# Patient Record
Sex: FEMALE | URBAN - NONMETROPOLITAN AREA
[De-identification: names, ages, dates, MRNs, and addresses within clinical notes are randomized per-mention and may not be internally consistent; named-entity substitution may affect disease eponyms.]

---

## 2019-04-22 ENCOUNTER — OUTSIDE SERVICES (OUTPATIENT)
Dept: FAMILY MEDICINE CLINIC | Age: 79
End: 2019-04-22
Payer: MEDICARE

## 2019-04-22 VITALS
WEIGHT: 131.6 LBS | RESPIRATION RATE: 18 BRPM | SYSTOLIC BLOOD PRESSURE: 114 MMHG | DIASTOLIC BLOOD PRESSURE: 76 MMHG | BODY MASS INDEX: 22.47 KG/M2 | TEMPERATURE: 98 F | HEART RATE: 72 BPM | OXYGEN SATURATION: 96 % | HEIGHT: 64 IN

## 2019-04-22 DIAGNOSIS — J30.89 NON-SEASONAL ALLERGIC RHINITIS DUE TO OTHER ALLERGIC TRIGGER: ICD-10-CM

## 2019-04-22 DIAGNOSIS — G30.1 LATE ONSET ALZHEIMER'S DISEASE WITH BEHAVIORAL DISTURBANCE (HCC): ICD-10-CM

## 2019-04-22 DIAGNOSIS — K21.9 GASTROESOPHAGEAL REFLUX DISEASE WITHOUT ESOPHAGITIS: ICD-10-CM

## 2019-04-22 DIAGNOSIS — M15.3 OTHER SECONDARY OSTEOARTHRITIS OF MULTIPLE SITES: ICD-10-CM

## 2019-04-22 DIAGNOSIS — F33.1 MODERATE EPISODE OF RECURRENT MAJOR DEPRESSIVE DISORDER (HCC): ICD-10-CM

## 2019-04-22 DIAGNOSIS — G47.09 OTHER INSOMNIA: ICD-10-CM

## 2019-04-22 DIAGNOSIS — L12.0 BULLOUS PEMPHIGOID: ICD-10-CM

## 2019-04-22 DIAGNOSIS — F02.818 LATE ONSET ALZHEIMER'S DISEASE WITH BEHAVIORAL DISTURBANCE (HCC): ICD-10-CM

## 2019-04-22 DIAGNOSIS — I10 ESSENTIAL HYPERTENSION: Primary | ICD-10-CM

## 2019-04-22 PROBLEM — J30.9 ALLERGIC RHINITIS: Status: ACTIVE | Noted: 2019-04-22

## 2019-04-22 PROBLEM — M15.9 POLYOSTEOARTHRITIS: Status: ACTIVE | Noted: 2019-04-22

## 2019-04-22 PROBLEM — G30.9 ALZHEIMER'S DISEASE (HCC): Status: ACTIVE | Noted: 2019-04-22

## 2019-04-22 PROBLEM — F02.80 ALZHEIMER'S DISEASE (HCC): Status: ACTIVE | Noted: 2019-04-22

## 2019-04-22 PROBLEM — F32.9 MDD (MAJOR DEPRESSIVE DISORDER): Status: ACTIVE | Noted: 2019-04-22

## 2019-04-22 PROCEDURE — 99309 SBSQ NF CARE MODERATE MDM 30: CPT | Performed by: NURSE PRACTITIONER

## 2019-04-22 RX ORDER — MONTELUKAST SODIUM 10 MG/1
10 TABLET ORAL NIGHTLY
COMMUNITY

## 2019-04-22 RX ORDER — DIAPER,BRIEF,INFANT-TODD,DISP
EACH MISCELLANEOUS DAILY
COMMUNITY

## 2019-04-22 RX ORDER — MYCOPHENOLATE MOFETIL 500 MG/1
500 TABLET ORAL 2 TIMES DAILY
COMMUNITY

## 2019-04-22 RX ORDER — CALCIUM CARBONATE 500(1250)
1500 TABLET ORAL DAILY
COMMUNITY
End: 2019-12-06

## 2019-04-22 RX ORDER — BUSPIRONE HYDROCHLORIDE 5 MG/1
7.5 TABLET ORAL 3 TIMES DAILY
COMMUNITY

## 2019-04-22 RX ORDER — LORAZEPAM 1 MG/1
0.5 TABLET ORAL NIGHTLY
COMMUNITY
End: 2019-12-06

## 2019-04-22 RX ORDER — SUCRALFATE 1 G/1
1 TABLET ORAL 2 TIMES DAILY
COMMUNITY

## 2019-04-22 RX ORDER — DIVALPROEX SODIUM 125 MG/1
250 CAPSULE, COATED PELLETS ORAL 3 TIMES DAILY
COMMUNITY

## 2019-04-22 RX ORDER — TRIAMCINOLONE ACETONIDE 1 MG/G
CREAM TOPICAL NIGHTLY
COMMUNITY

## 2019-04-22 RX ORDER — TRAZODONE HYDROCHLORIDE 50 MG/1
50 TABLET ORAL NIGHTLY
COMMUNITY

## 2019-04-22 RX ORDER — LORAZEPAM 0.5 MG/1
0.5 TABLET ORAL DAILY
COMMUNITY
End: 2019-12-06

## 2019-04-22 RX ORDER — FEXOFENADINE HCL 180 MG/1
180 TABLET ORAL DAILY
COMMUNITY

## 2019-04-22 RX ORDER — SIMVASTATIN 40 MG
40 TABLET ORAL NIGHTLY
COMMUNITY
End: 2019-04-22

## 2019-04-22 RX ORDER — SERTRALINE HYDROCHLORIDE 100 MG/1
100 TABLET, FILM COATED ORAL DAILY
COMMUNITY

## 2019-04-22 RX ORDER — RANITIDINE 150 MG/1
150 TABLET ORAL DAILY
COMMUNITY
End: 2019-12-06

## 2019-04-22 RX ORDER — BISACODYL 10 MG
10 SUPPOSITORY, RECTAL RECTAL DAILY PRN
COMMUNITY

## 2019-04-22 RX ORDER — TRIAMTERENE AND HYDROCHLOROTHIAZIDE 37.5; 25 MG/1; MG/1
1 TABLET ORAL DAILY
COMMUNITY
End: 2019-05-20 | Stop reason: ALTCHOICE

## 2019-04-22 RX ORDER — LEVOTHYROXINE SODIUM 0.07 MG/1
75 TABLET ORAL DAILY
COMMUNITY
End: 2019-04-22

## 2019-04-22 SDOH — HEALTH STABILITY: MENTAL HEALTH: HOW OFTEN DO YOU HAVE A DRINK CONTAINING ALCOHOL?: NEVER

## 2019-04-22 ASSESSMENT — ENCOUNTER SYMPTOMS
COUGH: 0
TROUBLE SWALLOWING: 1
RHINORRHEA: 0
ABDOMINAL DISTENTION: 0
VOMITING: 0
WHEEZING: 0
DIARRHEA: 0
NAUSEA: 0
SHORTNESS OF BREATH: 0
EYE REDNESS: 0
SORE THROAT: 0
BLOOD IN STOOL: 0
ABDOMINAL PAIN: 1
CONSTIPATION: 0

## 2019-04-22 NOTE — PROGRESS NOTES
Rodolfo Gupta is a 78 y.o. female who presents today for her medical conditions/complaints as noted below. Chief Complaint   Patient presents with    Other     One month visit for follow up on chronic health conditions           HPI:     HPI     Henrique Lauren was seen and examined today for her monthly visit. She is seen up in her chair. She is restless today and has been having increased agitation in the evening with nursing staff specifically at meal time. She also complains today of her stomach hurting which does seem to be a chronic complaint. She does have current medications but these do not seem to have addressed this complaint. She does have significant dementia so I am not sure this is an accurate description of pain. Her weight is overall stable. Her labs from 4/1 have been reviewed today and these are stable. She is dependent on staff for all ADL's. No falls since September. Past Medical History:   Diagnosis Date    Allergic rhinitis     Alzheimer's disease     Anxiety disorder     Bullous pemphigoid     Dysphagia     GERD (gastroesophageal reflux disease)     HLD (hyperlipidemia)     HTN (hypertension)     MDD (major depressive disorder)     Meniere's disease     Parkinson's disease ([facility] [address]     Polyosteoarthritis       History reviewed. No pertinent surgical history. Family History   Family history unknown: Yes       Social History     Tobacco Use    Smoking status: Never Smoker    Smokeless tobacco: Never Used   Substance Use Topics    Alcohol use: Never     Frequency: Never      Current Outpatient Medications   Medication Sig Dispense Refill    acetaminophen (TYLENOL) 325 MG tablet Take 650 mg by mouth three times daily And every 4hours prn pan or fever.  LORazepam (ATIVAN) 0.5 MG tablet Take 0.5 mg by mouth daily.  LORazepam (ATIVAN) 1 MG tablet Take 1 mg by mouth nightly.       Menthol, Topical Analgesic, (BIOFREEZE) 4 % GEL Apply topically as needed      bisacodyl (DULCOLAX) 10 MG suppository Place 10 mg rectally daily as needed for Constipation      busPIRone (BUSPAR) 5 MG tablet Take 7.5 mg by mouth 2 times daily      calcium carbonate (OSCAL) 500 MG TABS tablet Take 500 mg by mouth 2 times daily      mycophenolate (CELLCEPT) 500 MG tablet Take 500 mg by mouth 2 times daily      fexofenadine (ALLEGRA) 180 MG tablet Take 180 mg by mouth daily      hydrocortisone 1 % cream Apply topically daily Apply topically 2 times daily.  magnesium hydroxide (MILK OF MAGNESIA) 400 MG/5ML suspension Take 30 mLs by mouth daily as needed for Constipation      montelukast (SINGULAIR) 10 MG tablet Take 10 mg by mouth nightly      sucralfate (CARAFATE) 1 GM tablet Take 1 g by mouth 4 times daily      traZODone (DESYREL) 50 MG tablet Take 50 mg by mouth nightly      triamcinolone (KENALOG) 0.1 % cream Apply topically nightly Apply topically 2 times daily.  triamterene-hydrochlorothiazide (MAXZIDE-25) 37.5-25 MG per tablet Take 1 tablet by mouth daily      vitamin D (CHOLECALCIFEROL) 1000 UNIT TABS tablet Take 1,000 Units by mouth daily      sertraline (ZOLOFT) 100 MG tablet Take 100 mg by mouth daily      ranitidine (ZANTAC) 150 MG tablet Take 150 mg by mouth daily      divalproex (DEPAKOTE SPRINKLE) 125 MG capsule Take 125 mg by mouth 2 times daily       No current facility-administered medications for this visit.       Allergies   Allergen Reactions    Aricept [Donepezil Hcl]     Cardizem [Diltiazem Hcl]     Cardura [Doxazosin Mesylate]     Codeine     Doxycycline     Macrodantin [Nitrofurantoin Macrocrystal]     Sulfa Antibiotics        Health Maintenance   Topic Date Due    Potassium monitoring  1940    Creatinine monitoring  1940    DTaP/Tdap/Td vaccine (1 - Tdap) 01/11/1959    Shingles Vaccine (1 of 2) 01/11/1990    DEXA (modify frequency per FRAX score)  01/11/2005    Pneumococcal 65+ years Vaccine (1 of 2 - PCV13) 01/11/2005    Flu vaccine No murmur heard. Pulmonary/Chest: Effort normal and breath sounds normal. No stridor. No respiratory distress. She has no wheezes. She has no rales. Abdominal: Soft. Bowel sounds are normal. She exhibits no distension. There is no tenderness. Musculoskeletal: Normal range of motion. She exhibits no edema or deformity. Right shoulder: She exhibits no tenderness, no bony tenderness and no pain. Left shoulder: She exhibits no tenderness, no bony tenderness and no pain. Cervical back: She exhibits no tenderness, no bony tenderness and no pain. Thoracic back: She exhibits no tenderness, no bony tenderness, no pain and no spasm. Lumbar back: She exhibits no tenderness, no bony tenderness and no pain. Lymphadenopathy:     She has no cervical adenopathy. Right: No supraclavicular adenopathy present. Left: No supraclavicular adenopathy present. Neurological: She is alert. She is disoriented. She displays no atrophy and no tremor. A sensory deficit is present. She exhibits abnormal muscle tone. She displays no seizure activity. Coordination and gait abnormal.   Skin: Skin is warm, dry and intact. Capillary refill takes 2 to 3 seconds. No rash noted. No erythema. No pallor. Psychiatric: Her mood appears anxious. Her speech is delayed. She is agitated. Thought content is paranoid. Cognition and memory are impaired. She expresses impulsivity. She exhibits abnormal recent memory and abnormal remote memory. She is inattentive. Nursing note and vitals reviewed. /76   Pulse 72   Temp 98 °F (36.7 °C)   Resp 18   Ht 5' 4\" (1.626 m)   Wt 131 lb 9.6 oz (59.7 kg)   SpO2 96%   BMI 22.59 kg/m²     Assessment:       Diagnosis Orders   1. Essential hypertension     2. Late onset Alzheimer's disease with behavioral disturbance     3. Bullous pemphigoid     4. Moderate episode of recurrent major depressive disorder ([facility] [address]     5.  Gastroesophageal reflux disease without esophagitis     6. Non-seasonal allergic rhinitis due to other allergic trigger     7. Other insomnia     8. Other secondary osteoarthritis of multiple sites         Plan:     1. HTN- Chronic and stable. Continue Triamterene/HCTZ. Monitor. Blood pressures stable. 2. Alzheimer's dementia with anxiety- Chronic and increased agitation in evening. Continue Ativan, Zoloft and Buspar. Will add a low dose of Depakote in the evening. 3. Bullous pemphigous-  Will continue Cellcept. No skin issues. Labs stable. 4. Depression- Chronic and stable. continue Zoloft. Will continue to monitor. 5. GERD with hx of PUD- Complains of stomach pain. This is a chronic complaint. Will dc Pantoprazole and Pepcid. Will increase Carafate and change to Zantac. eating well with assistance. Weights are good. 6. Allergic rhinitis-continue fexofenadline, singulair. 7. Insomnia- Chronic and stable. Continue trazodone. Will continue to monitor. 8. OA- Chronic and stable. Will continue Tylenol and monitor.      Electronically signed by AMIRAH Lucia CNP on 4/22/2019 at 11:01 AM

## 2019-05-20 ENCOUNTER — OUTSIDE SERVICES (OUTPATIENT)
Dept: FAMILY MEDICINE CLINIC | Age: 79
End: 2019-05-20
Payer: MEDICARE

## 2019-05-20 VITALS
BODY MASS INDEX: 22.59 KG/M2 | SYSTOLIC BLOOD PRESSURE: 96 MMHG | HEART RATE: 72 BPM | OXYGEN SATURATION: 93 % | WEIGHT: 131.6 LBS | RESPIRATION RATE: 16 BRPM | DIASTOLIC BLOOD PRESSURE: 59 MMHG | TEMPERATURE: 97.1 F

## 2019-05-20 DIAGNOSIS — L12.0 BULLOUS PEMPHIGOID: ICD-10-CM

## 2019-05-20 DIAGNOSIS — F33.1 MODERATE EPISODE OF RECURRENT MAJOR DEPRESSIVE DISORDER (HCC): ICD-10-CM

## 2019-05-20 DIAGNOSIS — G47.09 OTHER INSOMNIA: ICD-10-CM

## 2019-05-20 DIAGNOSIS — F02.818 LATE ONSET ALZHEIMER'S DISEASE WITH BEHAVIORAL DISTURBANCE (HCC): ICD-10-CM

## 2019-05-20 DIAGNOSIS — J30.89 NON-SEASONAL ALLERGIC RHINITIS DUE TO OTHER ALLERGIC TRIGGER: ICD-10-CM

## 2019-05-20 DIAGNOSIS — I10 ESSENTIAL HYPERTENSION: Primary | ICD-10-CM

## 2019-05-20 DIAGNOSIS — K21.9 GASTROESOPHAGEAL REFLUX DISEASE WITHOUT ESOPHAGITIS: ICD-10-CM

## 2019-05-20 DIAGNOSIS — G30.1 LATE ONSET ALZHEIMER'S DISEASE WITH BEHAVIORAL DISTURBANCE (HCC): ICD-10-CM

## 2019-05-20 DIAGNOSIS — M15.3 OTHER SECONDARY OSTEOARTHRITIS OF MULTIPLE SITES: ICD-10-CM

## 2019-05-20 PROCEDURE — 99309 SBSQ NF CARE MODERATE MDM 30: CPT | Performed by: NURSE PRACTITIONER

## 2019-05-20 RX ORDER — HYDROCHLOROTHIAZIDE 12.5 MG/1
12.5 TABLET ORAL DAILY
COMMUNITY
End: 2019-06-24

## 2019-05-20 ASSESSMENT — ENCOUNTER SYMPTOMS
NAUSEA: 0
RHINORRHEA: 0
DIARRHEA: 0
WHEEZING: 0
CONSTIPATION: 0
COUGH: 0
TROUBLE SWALLOWING: 0
SORE THROAT: 0
EYE REDNESS: 0
VOMITING: 0
SHORTNESS OF BREATH: 0

## 2019-05-20 NOTE — PROGRESS NOTES
ourmaurice Cramer is a 78 y.o. female who presents today for her medical conditions/complaints as noted below. Chief Complaint   Patient presents with    1 Month Follow-Up     Follow up on chronic health conditions           HPI:     CJ Sandoval is seen today for her monthly visit. She has had increased behaviors and these are increase in the evening. She is quiet this am and does not talk. Her chart is reviewed and there is no significant changes in her weight. She is running low with her blood pressures although. She eats poorly for her supper meal and this is likely due to behaviors. Past Medical History:   Diagnosis Date    Allergic rhinitis     Alzheimer's disease     Anxiety disorder     Bullous pemphigoid     Dysphagia     GERD (gastroesophageal reflux disease)     HLD (hyperlipidemia)     HTN (hypertension)     MDD (major depressive disorder)     Meniere's disease     Parkinson's disease ([facility] [address]     Polyosteoarthritis       No past surgical history on file. Family History   Family history unknown: Yes       Social History     Tobacco Use    Smoking status: Never Smoker    Smokeless tobacco: Never Used   Substance Use Topics    Alcohol use: Never     Frequency: Never      Current Outpatient Medications   Medication Sig Dispense Refill    hydrochlorothiazide (HYDRODIURIL) 12.5 MG tablet Take 12.5 mg by mouth daily      acetaminophen (TYLENOL) 325 MG tablet Take 650 mg by mouth three times daily And every 4hours prn pan or fever.  LORazepam (ATIVAN) 0.5 MG tablet Take 0.5 mg by mouth daily.  LORazepam (ATIVAN) 1 MG tablet Take 1 mg by mouth nightly.       Menthol, Topical Analgesic, (BIOFREEZE) 4 % GEL Apply topically as needed      bisacodyl (DULCOLAX) 10 MG suppository Place 10 mg rectally daily as needed for Constipation      busPIRone (BUSPAR) 5 MG tablet Take 7.5 mg by mouth 3 times daily       calcium carbonate (OSCAL) 500 MG TABS tablet Take 1,500 mg by mouth daily  mycophenolate (CELLCEPT) 500 MG tablet Take 500 mg by mouth 2 times daily      fexofenadine (ALLEGRA) 180 MG tablet Take 180 mg by mouth daily      hydrocortisone 1 % cream Apply topically daily Apply topically 2 times daily.  magnesium hydroxide (MILK OF MAGNESIA) 400 MG/5ML suspension Take 30 mLs by mouth daily as needed for Constipation      montelukast (SINGULAIR) 10 MG tablet Take 10 mg by mouth nightly      sucralfate (CARAFATE) 1 GM tablet Take 1 g by mouth 4 times daily      traZODone (DESYREL) 50 MG tablet Take 50 mg by mouth nightly      triamcinolone (KENALOG) 0.1 % cream Apply topically nightly Apply topically 2 times daily.  vitamin D (CHOLECALCIFEROL) 1000 UNIT TABS tablet Take 1,000 Units by mouth daily      sertraline (ZOLOFT) 100 MG tablet Take 100 mg by mouth daily      ranitidine (ZANTAC) 150 MG tablet Take 150 mg by mouth daily      divalproex (DEPAKOTE SPRINKLE) 125 MG capsule Take 250 mg by mouth three times daily        No current facility-administered medications for this visit. Allergies   Allergen Reactions    Aricept [Donepezil Hcl]     Cardizem [Diltiazem Hcl]     Cardura [Doxazosin Mesylate]     Codeine     Doxycycline     Macrodantin [Nitrofurantoin Macrocrystal]     Sulfa Antibiotics        Health Maintenance   Topic Date Due    Potassium monitoring  1940    Creatinine monitoring  1940    DTaP/Tdap/Td vaccine (1 - Tdap) 01/11/1959    Shingles Vaccine (1 of 2) 01/11/1990    DEXA (modify frequency per FRAX score)  01/11/2005    Pneumococcal 65+ years Vaccine (1 of 2 - PCV13) 01/11/2005    Flu vaccine (Season Ended) 09/01/2019       Subjective:      Review of Systems   Constitutional: Negative for chills, fatigue, fever and unexpected weight change. HENT: Negative for congestion, rhinorrhea, sore throat and trouble swallowing. Eyes: Positive for visual disturbance. Negative for redness.    Respiratory: Negative for cough, shortness of breath and wheezing. Cardiovascular: Negative for chest pain and leg swelling. Gastrointestinal: Negative for constipation, diarrhea, nausea and vomiting. Endocrine: Negative for polydipsia and polyuria. Genitourinary: Positive for decreased urine volume. Negative for dysuria, frequency and hematuria. Musculoskeletal: Positive for gait problem (up per staff assist). Negative for arthralgias and myalgias. Skin: Negative for rash and wound. Allergic/Immunologic: Negative for environmental allergies and immunocompromised state. Neurological: Positive for speech difficulty and weakness. Negative for dizziness, light-headedness and headaches. Hematological: Does not bruise/bleed easily. Psychiatric/Behavioral: Positive for agitation, behavioral problems (per staff interview), confusion, decreased concentration and dysphoric mood. Negative for sleep disturbance. The patient is nervous/anxious (more in the evening) and is hyperactive. Objective:     Physical Exam   Constitutional: She appears well-developed and well-nourished. No distress. HENT:   Head: Normocephalic and atraumatic. Right Ear: External ear normal.   Left Ear: External ear normal.   Nose: Nose normal.   Mouth/Throat: Oropharynx is clear and moist and mucous membranes are normal.   Eyes: Conjunctivae and EOM are normal. Right eye exhibits no discharge. Left eye exhibits no discharge. No scleral icterus. Neck: Neck supple. No JVD present. No thyromegaly present. Cardiovascular: Normal rate, regular rhythm, normal heart sounds and intact distal pulses. Pulmonary/Chest: Effort normal and breath sounds normal. No stridor. No respiratory distress. She has no wheezes. She has no rales. Abdominal: Soft. Bowel sounds are normal. She exhibits no distension. There is no tenderness. Musculoskeletal: Normal range of motion. She exhibits no edema or deformity.         Right shoulder: She exhibits no tenderness, no bony Cellcept. No skin issues. Labs stable. 4. Depression- Chronic and stable. continue Zoloft. Will continue to monitor. 5. GERD with hx of PUD- No complaints. Will continue Carafate and Zantac. Weights are stabpe  6. Allergic rhinitis-continue fexofenadline, singulair. 7. Insomnia- Chronic and stable. Continue trazodone. Will continue to monitor. 8. OA- Chronic and stable. Will continue Tylenol and monitor.      Electronically signed by AMIRAH Garcia CNP on 5/20/2019 at 10:39 AM

## 2019-06-24 ENCOUNTER — OUTSIDE SERVICES (OUTPATIENT)
Dept: FAMILY MEDICINE CLINIC | Age: 79
End: 2019-06-24
Payer: MEDICARE

## 2019-06-24 VITALS
OXYGEN SATURATION: 94 % | RESPIRATION RATE: 16 BRPM | DIASTOLIC BLOOD PRESSURE: 60 MMHG | SYSTOLIC BLOOD PRESSURE: 89 MMHG | HEART RATE: 71 BPM | BODY MASS INDEX: 22.18 KG/M2 | TEMPERATURE: 98.2 F | WEIGHT: 129.2 LBS

## 2019-06-24 DIAGNOSIS — J30.89 NON-SEASONAL ALLERGIC RHINITIS DUE TO OTHER ALLERGIC TRIGGER: ICD-10-CM

## 2019-06-24 DIAGNOSIS — G30.1 LATE ONSET ALZHEIMER'S DISEASE WITH BEHAVIORAL DISTURBANCE (HCC): ICD-10-CM

## 2019-06-24 DIAGNOSIS — L12.0 BULLOUS PEMPHIGOID: ICD-10-CM

## 2019-06-24 DIAGNOSIS — G47.09 OTHER INSOMNIA: ICD-10-CM

## 2019-06-24 DIAGNOSIS — K21.9 GASTROESOPHAGEAL REFLUX DISEASE WITHOUT ESOPHAGITIS: ICD-10-CM

## 2019-06-24 DIAGNOSIS — F02.818 LATE ONSET ALZHEIMER'S DISEASE WITH BEHAVIORAL DISTURBANCE (HCC): ICD-10-CM

## 2019-06-24 DIAGNOSIS — F33.1 MODERATE EPISODE OF RECURRENT MAJOR DEPRESSIVE DISORDER (HCC): ICD-10-CM

## 2019-06-24 DIAGNOSIS — M15.3 OTHER SECONDARY OSTEOARTHRITIS OF MULTIPLE SITES: ICD-10-CM

## 2019-06-24 DIAGNOSIS — I10 ESSENTIAL HYPERTENSION: Primary | ICD-10-CM

## 2019-06-24 PROCEDURE — 99309 SBSQ NF CARE MODERATE MDM 30: CPT | Performed by: NURSE PRACTITIONER

## 2019-06-24 ASSESSMENT — ENCOUNTER SYMPTOMS
NAUSEA: 0
VOMITING: 0
RHINORRHEA: 0
COUGH: 0
TROUBLE SWALLOWING: 0
DIARRHEA: 0
EYE REDNESS: 0
SORE THROAT: 0
SHORTNESS OF BREATH: 0
WHEEZING: 0
CONSTIPATION: 0

## 2019-06-24 NOTE — PROGRESS NOTES
Gabriel Damico is a 78 y.o. female who presents today for her medical conditions/complaints as noted below. Chief Complaint   Patient presents with    Dementia           HPI:         Brielle Whitehead is seen today for her monthly visit. She has had some improvement in her behaviors. She is quiet this afternoon and lying in bed. She has had a mild weight loss of 3# in the past month. Her blood pressures have been running low. Her spouse has passed away in the past month, but she does not seem to have been more tearful. Past Medical History:   Diagnosis Date    Allergic rhinitis     Alzheimer's disease     Anxiety disorder     Bullous pemphigoid     Dysphagia     GERD (gastroesophageal reflux disease)     HLD (hyperlipidemia)     HTN (hypertension)     MDD (major depressive disorder)     Meniere's disease     Parkinson's disease ([facility] [address]     Polyosteoarthritis       No past surgical history on file. Family History   Family history unknown: Yes       Social History     Tobacco Use    Smoking status: Never Smoker    Smokeless tobacco: Never Used   Substance Use Topics    Alcohol use: Never     Frequency: Never      Current Outpatient Medications   Medication Sig Dispense Refill    acetaminophen (TYLENOL) 325 MG tablet Take 650 mg by mouth three times daily And every 4hours prn pan or fever.  LORazepam (ATIVAN) 0.5 MG tablet Take 0.5 mg by mouth daily.  LORazepam (ATIVAN) 1 MG tablet Take 1 mg by mouth nightly.       Menthol, Topical Analgesic, (BIOFREEZE) 4 % GEL Apply topically as needed      bisacodyl (DULCOLAX) 10 MG suppository Place 10 mg rectally daily as needed for Constipation      busPIRone (BUSPAR) 5 MG tablet Take 7.5 mg by mouth 3 times daily       calcium carbonate (OSCAL) 500 MG TABS tablet Take 1,500 mg by mouth daily       mycophenolate (CELLCEPT) 500 MG tablet Take 500 mg by mouth 2 times daily      fexofenadine (ALLEGRA) 180 MG tablet Take 180 mg by mouth daily      hydrocortisone 1 % cream Apply topically daily Apply topically 2 times daily.  magnesium hydroxide (MILK OF MAGNESIA) 400 MG/5ML suspension Take 30 mLs by mouth daily as needed for Constipation      montelukast (SINGULAIR) 10 MG tablet Take 10 mg by mouth nightly      sucralfate (CARAFATE) 1 GM tablet Take 1 g by mouth 4 times daily      traZODone (DESYREL) 50 MG tablet Take 50 mg by mouth nightly      triamcinolone (KENALOG) 0.1 % cream Apply topically nightly Apply topically 2 times daily.  vitamin D (CHOLECALCIFEROL) 1000 UNIT TABS tablet Take 1,000 Units by mouth daily      sertraline (ZOLOFT) 100 MG tablet Take 100 mg by mouth daily      ranitidine (ZANTAC) 150 MG tablet Take 150 mg by mouth daily      divalproex (DEPAKOTE SPRINKLE) 125 MG capsule Take 250 mg by mouth three times daily        No current facility-administered medications for this visit. Allergies   Allergen Reactions    Aricept [Donepezil Hcl]     Cardizem [Diltiazem Hcl]     Cardura [Doxazosin Mesylate]     Codeine     Doxycycline     Macrodantin [Nitrofurantoin Macrocrystal]     Sulfa Antibiotics        Health Maintenance   Topic Date Due    Potassium monitoring  1940    Creatinine monitoring  1940    DTaP/Tdap/Td vaccine (1 - Tdap) 01/11/1959    Shingles Vaccine (1 of 2) 01/11/1990    DEXA (modify frequency per FRAX score)  01/11/2005    Pneumococcal 65+ years Vaccine (1 of 2 - PCV13) 01/11/2005    Flu vaccine (Season Ended) 09/01/2019       Subjective:      Review of Systems   Constitutional: Negative for chills, fatigue, fever and unexpected weight change. HENT: Negative for congestion, rhinorrhea, sore throat and trouble swallowing. Eyes: Positive for visual disturbance. Negative for redness. Respiratory: Negative for cough, shortness of breath and wheezing. Cardiovascular: Negative for chest pain and leg swelling.    Gastrointestinal: Negative for constipation, diarrhea, nausea and bony tenderness and no pain. Thoracic back: She exhibits no tenderness, no bony tenderness, no pain and no spasm. Lumbar back: She exhibits no tenderness, no bony tenderness and no pain. Lymphadenopathy:     She has no cervical adenopathy. Right: No supraclavicular adenopathy present. Left: No supraclavicular adenopathy present. Neurological: She is alert. She is disoriented. She displays no atrophy. She exhibits normal muscle tone. She displays no seizure activity. Skin: Skin is warm, dry and intact. No rash noted. No erythema. Psychiatric: Her mood appears anxious (more in the evening, quiet today). Her speech is delayed. She is withdrawn and combative (at times per facility staff). Cognition and memory are impaired. She expresses impulsivity. She exhibits abnormal recent memory and abnormal remote memory. Nursing note and vitals reviewed. BP 89/60   Pulse 71   Temp 98.2 °F (36.8 °C)   Resp 16   Wt 129 lb 3.2 oz (58.6 kg)   SpO2 94%   BMI 22.18 kg/m²     Assessment:       Diagnosis Orders   1. Essential hypertension     2. Late onset Alzheimer's disease with behavioral disturbance     3. Bullous pemphigoid     4. Moderate episode of recurrent major depressive disorder ([facility] [address]     5. Gastroesophageal reflux disease without esophagitis     6. Non-seasonal allergic rhinitis due to other allergic trigger     7. Other insomnia     8. Other secondary osteoarthritis of multiple sites         Plan:   1. HTN- Chronic with continued low blood pressures. Will dc HCTZ and monitor. 2. Alzheimer's dementia with anxiety- Chronic and  agitation in evening. Continue Ativan, Zoloft and Buspar. Behaviors some better per staff interview. 3. Bullous pemphigous-  Will continue Cellcept. No skin issues. 4. Depression- Chronic and stable. continue Zoloft. Will continue to monitor. 5. GERD with hx of PUD- No complaints. Will continue Carafate and Zantac. Weights are stable  6.  Allergic rhinitis-continue fexofenadline, singulair. 7. Insomnia- Chronic and stable. Continue trazodone. Will continue to monitor. 8. OA- Chronic and stable. Will continue Tylenol and monitor.      Electronically signed by AMIRAH Crockett CNP on 6/24/2019 at 4:20 PM

## 2019-07-29 ENCOUNTER — OUTSIDE SERVICES (OUTPATIENT)
Dept: FAMILY MEDICINE CLINIC | Age: 79
End: 2019-07-29
Payer: MEDICARE

## 2019-07-29 VITALS
HEART RATE: 88 BPM | WEIGHT: 129 LBS | BODY MASS INDEX: 22.14 KG/M2 | SYSTOLIC BLOOD PRESSURE: 126 MMHG | DIASTOLIC BLOOD PRESSURE: 72 MMHG | OXYGEN SATURATION: 96 % | TEMPERATURE: 98 F | RESPIRATION RATE: 16 BRPM

## 2019-07-29 DIAGNOSIS — J30.89 NON-SEASONAL ALLERGIC RHINITIS DUE TO OTHER ALLERGIC TRIGGER: ICD-10-CM

## 2019-07-29 DIAGNOSIS — G30.1 LATE ONSET ALZHEIMER'S DISEASE WITH BEHAVIORAL DISTURBANCE (HCC): ICD-10-CM

## 2019-07-29 DIAGNOSIS — K21.9 GASTROESOPHAGEAL REFLUX DISEASE WITHOUT ESOPHAGITIS: ICD-10-CM

## 2019-07-29 DIAGNOSIS — F33.1 MODERATE EPISODE OF RECURRENT MAJOR DEPRESSIVE DISORDER (HCC): ICD-10-CM

## 2019-07-29 DIAGNOSIS — I10 ESSENTIAL HYPERTENSION: Primary | ICD-10-CM

## 2019-07-29 DIAGNOSIS — F02.818 LATE ONSET ALZHEIMER'S DISEASE WITH BEHAVIORAL DISTURBANCE (HCC): ICD-10-CM

## 2019-07-29 DIAGNOSIS — M15.3 OTHER SECONDARY OSTEOARTHRITIS OF MULTIPLE SITES: ICD-10-CM

## 2019-07-29 DIAGNOSIS — L12.0 BULLOUS PEMPHIGOID: ICD-10-CM

## 2019-07-29 DIAGNOSIS — G47.09 OTHER INSOMNIA: ICD-10-CM

## 2019-07-29 PROCEDURE — 99309 SBSQ NF CARE MODERATE MDM 30: CPT | Performed by: NURSE PRACTITIONER

## 2019-07-29 ASSESSMENT — ENCOUNTER SYMPTOMS
DIARRHEA: 0
NAUSEA: 0
COUGH: 0
CONSTIPATION: 0
EYE REDNESS: 0
RHINORRHEA: 0
VOMITING: 0
WHEEZING: 0
SORE THROAT: 0
TROUBLE SWALLOWING: 0
SHORTNESS OF BREATH: 0

## 2019-07-29 NOTE — PROGRESS NOTES
tablet Take 180 mg by mouth daily      hydrocortisone 1 % cream Apply topically daily Apply topically 2 times daily.  magnesium hydroxide (MILK OF MAGNESIA) 400 MG/5ML suspension Take 30 mLs by mouth daily as needed for Constipation      montelukast (SINGULAIR) 10 MG tablet Take 10 mg by mouth nightly      sucralfate (CARAFATE) 1 GM tablet Take 1 g by mouth 4 times daily      traZODone (DESYREL) 50 MG tablet Take 50 mg by mouth nightly      triamcinolone (KENALOG) 0.1 % cream Apply topically nightly Apply topically 2 times daily.  vitamin D (CHOLECALCIFEROL) 1000 UNIT TABS tablet Take 1,000 Units by mouth daily      sertraline (ZOLOFT) 100 MG tablet Take 125 mg by mouth daily       ranitidine (ZANTAC) 150 MG tablet Take 150 mg by mouth daily      divalproex (DEPAKOTE SPRINKLE) 125 MG capsule Take 250 mg by mouth three times daily        No current facility-administered medications for this visit. Allergies   Allergen Reactions    Aricept [Donepezil Hcl]     Cardizem [Diltiazem Hcl]     Cardura [Doxazosin Mesylate]     Codeine     Doxycycline     Macrodantin [Nitrofurantoin Macrocrystal]     Sulfa Antibiotics        Health Maintenance   Topic Date Due    Potassium monitoring  1940    Creatinine monitoring  1940    DTaP/Tdap/Td vaccine (1 - Tdap) 01/11/1959    Shingles Vaccine (1 of 2) 01/11/1990    DEXA (modify frequency per FRAX score)  01/11/2005    Pneumococcal 65+ years Vaccine (1 of 2 - PCV13) 01/11/2005    Flu vaccine (1) 09/01/2019       Subjective:      Review of Systems   Constitutional: Negative for chills, fatigue, fever and unexpected weight change. HENT: Negative for congestion, rhinorrhea, sore throat and trouble swallowing. Eyes: Positive for visual disturbance. Negative for redness. Respiratory: Negative for cough, shortness of breath and wheezing. Cardiovascular: Negative for chest pain and leg swelling.    Gastrointestinal: Negative for

## 2019-08-23 ENCOUNTER — OUTSIDE SERVICES (OUTPATIENT)
Dept: FAMILY MEDICINE CLINIC | Age: 79
End: 2019-08-23
Payer: MEDICARE

## 2019-08-23 DIAGNOSIS — I10 ESSENTIAL HYPERTENSION: Primary | ICD-10-CM

## 2019-08-23 DIAGNOSIS — K21.9 GASTROESOPHAGEAL REFLUX DISEASE WITHOUT ESOPHAGITIS: ICD-10-CM

## 2019-08-23 DIAGNOSIS — M15.3 OTHER SECONDARY OSTEOARTHRITIS OF MULTIPLE SITES: ICD-10-CM

## 2019-08-23 DIAGNOSIS — L12.0 BULLOUS PEMPHIGOID: ICD-10-CM

## 2019-08-23 DIAGNOSIS — F33.1 MODERATE EPISODE OF RECURRENT MAJOR DEPRESSIVE DISORDER (HCC): ICD-10-CM

## 2019-08-23 DIAGNOSIS — G30.1 LATE ONSET ALZHEIMER'S DISEASE WITH BEHAVIORAL DISTURBANCE (HCC): ICD-10-CM

## 2019-08-23 DIAGNOSIS — F02.818 LATE ONSET ALZHEIMER'S DISEASE WITH BEHAVIORAL DISTURBANCE (HCC): ICD-10-CM

## 2019-08-23 DIAGNOSIS — G47.09 OTHER INSOMNIA: ICD-10-CM

## 2019-08-23 PROCEDURE — 99309 SBSQ NF CARE MODERATE MDM 30: CPT | Performed by: FAMILY MEDICINE

## 2019-08-23 ASSESSMENT — ENCOUNTER SYMPTOMS
SHORTNESS OF BREATH: 0
SORE THROAT: 0
ABDOMINAL PAIN: 0
CONSTIPATION: 0
DIARRHEA: 0
RHINORRHEA: 0
COUGH: 0
WHEEZING: 0

## 2019-08-23 NOTE — PROGRESS NOTES
Bullous pemphigous-  Chronic, currently asymptomatic. Will continue Cellcept. 3. MDD- chronic, stable. Continue zoloft. 4. Alzheimer's dementia with anxiety- Chronic with behaviors, moreso in evening. Continue Ativan, Zoloft and Buspar. Safety precautions. Doing well with increase of Zoloft. 5. GERD with hx of PUD- asymptomatic. Will continue Carafate and Zantac. Weights are down slightly since past month. Will monitor  6. OA- chronic and stable. No complaints today. Tylenol prn.  7. Insomnia- controlled with trazodone.         Electronically signed by Larisa Cross MD on 8/23/2019 at 11:44 AM

## 2019-09-16 ENCOUNTER — OUTSIDE SERVICES (OUTPATIENT)
Dept: FAMILY MEDICINE CLINIC | Age: 79
End: 2019-09-16
Payer: MEDICARE

## 2019-09-16 VITALS
RESPIRATION RATE: 16 BRPM | WEIGHT: 128 LBS | DIASTOLIC BLOOD PRESSURE: 80 MMHG | SYSTOLIC BLOOD PRESSURE: 124 MMHG | BODY MASS INDEX: 21.97 KG/M2 | HEART RATE: 70 BPM | TEMPERATURE: 98 F | OXYGEN SATURATION: 96 %

## 2019-09-16 DIAGNOSIS — K21.9 GASTROESOPHAGEAL REFLUX DISEASE WITHOUT ESOPHAGITIS: ICD-10-CM

## 2019-09-16 DIAGNOSIS — G30.1 LATE ONSET ALZHEIMER'S DISEASE WITH BEHAVIORAL DISTURBANCE (HCC): ICD-10-CM

## 2019-09-16 DIAGNOSIS — G47.09 OTHER INSOMNIA: ICD-10-CM

## 2019-09-16 DIAGNOSIS — F33.1 MODERATE EPISODE OF RECURRENT MAJOR DEPRESSIVE DISORDER (HCC): ICD-10-CM

## 2019-09-16 DIAGNOSIS — I10 ESSENTIAL HYPERTENSION: Primary | ICD-10-CM

## 2019-09-16 DIAGNOSIS — F02.818 LATE ONSET ALZHEIMER'S DISEASE WITH BEHAVIORAL DISTURBANCE (HCC): ICD-10-CM

## 2019-09-16 DIAGNOSIS — L12.0 BULLOUS PEMPHIGOID: ICD-10-CM

## 2019-09-16 DIAGNOSIS — M15.3 OTHER SECONDARY OSTEOARTHRITIS OF MULTIPLE SITES: ICD-10-CM

## 2019-09-16 DIAGNOSIS — J30.89 NON-SEASONAL ALLERGIC RHINITIS DUE TO OTHER ALLERGIC TRIGGER: ICD-10-CM

## 2019-09-16 PROCEDURE — 99308 SBSQ NF CARE LOW MDM 20: CPT | Performed by: NURSE PRACTITIONER

## 2019-09-16 ASSESSMENT — ENCOUNTER SYMPTOMS
CONSTIPATION: 0
DIARRHEA: 0
EYE REDNESS: 0
SHORTNESS OF BREATH: 0
RHINORRHEA: 0
NAUSEA: 0
COUGH: 0
TROUBLE SWALLOWING: 0
SORE THROAT: 0
WHEEZING: 0
VOMITING: 0

## 2019-09-16 NOTE — PROGRESS NOTES
pain.        Left shoulder: She exhibits no tenderness, no bony tenderness and no pain. Cervical back: She exhibits no tenderness, no bony tenderness and no pain. Thoracic back: She exhibits no tenderness, no bony tenderness, no pain and no spasm. Lumbar back: She exhibits no tenderness, no bony tenderness and no pain. Lymphadenopathy:     She has no cervical adenopathy. Right: No supraclavicular adenopathy present. Left: No supraclavicular adenopathy present. Neurological: She is alert. She is disoriented. She displays no atrophy. A sensory deficit is present. She exhibits normal muscle tone. She displays no seizure activity. Coordination abnormal.   Skin: Skin is warm, dry and intact. No rash noted. No erythema. Psychiatric: Her mood appears anxious (more in the evening, quiet today). Her speech is delayed. She is withdrawn and combative (at times per facility staff). Cognition and memory are impaired. She expresses impulsivity. She exhibits abnormal recent memory and abnormal remote memory. Nursing note and vitals reviewed. /80   Pulse 70   Temp 98 °F (36.7 °C)   Resp 16   Wt 128 lb (58.1 kg)   SpO2 96%   BMI 21.97 kg/m²     Assessment:       Diagnosis Orders   1. Essential hypertension     2. Bullous pemphigoid     3. Moderate episode of recurrent major depressive disorder ([facility] [address]     4. Late onset Alzheimer's disease with behavioral disturbance     5. Gastroesophageal reflux disease without esophagitis     6. Other secondary osteoarthritis of multiple sites     7. Other insomnia     8. Non-seasonal allergic rhinitis due to other allergic trigger         Plan:   1. HTN- Chronic with continued low blood pressures. On no medications and blood pressures stable. 2. Alzheimer's dementia with anxiety- Chronic and  agitation in evening. Continue Ativan, Zoloft and Buspar. 3. Bullous pemphigous- Chronic and stable. Continue Cellcept. No skin issues.    4.

## 2019-10-05 DIAGNOSIS — F33.1 MODERATE EPISODE OF RECURRENT MAJOR DEPRESSIVE DISORDER (HCC): Primary | ICD-10-CM

## 2019-10-07 RX ORDER — LORAZEPAM 0.5 MG/1
TABLET ORAL
Qty: 90 TABLET | Refills: 2 | Status: SHIPPED | OUTPATIENT
Start: 2019-10-07 | End: 2020-01-05

## 2019-10-23 ENCOUNTER — OUTSIDE SERVICES (OUTPATIENT)
Dept: FAMILY MEDICINE CLINIC | Age: 79
End: 2019-10-23
Payer: MEDICARE

## 2019-10-23 VITALS
RESPIRATION RATE: 16 BRPM | SYSTOLIC BLOOD PRESSURE: 109 MMHG | BODY MASS INDEX: 21.59 KG/M2 | HEART RATE: 82 BPM | OXYGEN SATURATION: 98 % | WEIGHT: 125.8 LBS | TEMPERATURE: 97.9 F | DIASTOLIC BLOOD PRESSURE: 61 MMHG

## 2019-10-23 DIAGNOSIS — F33.1 MODERATE EPISODE OF RECURRENT MAJOR DEPRESSIVE DISORDER (HCC): Primary | ICD-10-CM

## 2019-10-23 DIAGNOSIS — J30.89 NON-SEASONAL ALLERGIC RHINITIS DUE TO OTHER ALLERGIC TRIGGER: ICD-10-CM

## 2019-10-23 DIAGNOSIS — I10 ESSENTIAL HYPERTENSION: ICD-10-CM

## 2019-10-23 DIAGNOSIS — F02.818 LATE ONSET ALZHEIMER'S DISEASE WITH BEHAVIORAL DISTURBANCE (HCC): ICD-10-CM

## 2019-10-23 DIAGNOSIS — M15.3 OTHER SECONDARY OSTEOARTHRITIS OF MULTIPLE SITES: ICD-10-CM

## 2019-10-23 DIAGNOSIS — K21.9 GASTROESOPHAGEAL REFLUX DISEASE WITHOUT ESOPHAGITIS: ICD-10-CM

## 2019-10-23 DIAGNOSIS — G47.09 OTHER INSOMNIA: ICD-10-CM

## 2019-10-23 DIAGNOSIS — L12.0 BULLOUS PEMPHIGOID: ICD-10-CM

## 2019-10-23 DIAGNOSIS — G30.1 LATE ONSET ALZHEIMER'S DISEASE WITH BEHAVIORAL DISTURBANCE (HCC): ICD-10-CM

## 2019-10-23 PROCEDURE — 99309 SBSQ NF CARE MODERATE MDM 30: CPT | Performed by: NURSE PRACTITIONER

## 2019-10-23 RX ORDER — TRAMADOL HYDROCHLORIDE 50 MG/1
50 TABLET ORAL 2 TIMES DAILY
COMMUNITY
End: 2019-10-23 | Stop reason: SDUPTHER

## 2019-10-23 RX ORDER — TRAMADOL HYDROCHLORIDE 50 MG/1
50 TABLET ORAL 2 TIMES DAILY
Qty: 60 TABLET | Refills: 3 | Status: SHIPPED | OUTPATIENT
Start: 2019-10-23 | End: 2019-11-22

## 2019-10-23 ASSESSMENT — ENCOUNTER SYMPTOMS
DIARRHEA: 0
CONSTIPATION: 0
EYE REDNESS: 0
WHEEZING: 0
TROUBLE SWALLOWING: 0
NAUSEA: 0
SORE THROAT: 0
SHORTNESS OF BREATH: 0
RHINORRHEA: 0
VOMITING: 0
COUGH: 0

## 2019-12-06 ENCOUNTER — OUTSIDE SERVICES (OUTPATIENT)
Dept: FAMILY MEDICINE CLINIC | Age: 79
End: 2019-12-06
Payer: MEDICARE

## 2019-12-06 VITALS
WEIGHT: 115.2 LBS | BODY MASS INDEX: 19.77 KG/M2 | OXYGEN SATURATION: 96 % | TEMPERATURE: 98.4 F | DIASTOLIC BLOOD PRESSURE: 80 MMHG | SYSTOLIC BLOOD PRESSURE: 143 MMHG | RESPIRATION RATE: 16 BRPM | HEART RATE: 75 BPM

## 2019-12-06 DIAGNOSIS — F33.1 MODERATE EPISODE OF RECURRENT MAJOR DEPRESSIVE DISORDER (HCC): Primary | ICD-10-CM

## 2019-12-06 DIAGNOSIS — K21.9 GASTROESOPHAGEAL REFLUX DISEASE WITHOUT ESOPHAGITIS: ICD-10-CM

## 2019-12-06 DIAGNOSIS — J30.89 NON-SEASONAL ALLERGIC RHINITIS DUE TO OTHER ALLERGIC TRIGGER: ICD-10-CM

## 2019-12-06 DIAGNOSIS — M15.3 OTHER SECONDARY OSTEOARTHRITIS OF MULTIPLE SITES: ICD-10-CM

## 2019-12-06 DIAGNOSIS — L12.0 BULLOUS PEMPHIGOID: ICD-10-CM

## 2019-12-06 DIAGNOSIS — F02.818 LATE ONSET ALZHEIMER'S DISEASE WITH BEHAVIORAL DISTURBANCE (HCC): ICD-10-CM

## 2019-12-06 DIAGNOSIS — G47.09 OTHER INSOMNIA: ICD-10-CM

## 2019-12-06 DIAGNOSIS — G30.1 LATE ONSET ALZHEIMER'S DISEASE WITH BEHAVIORAL DISTURBANCE (HCC): ICD-10-CM

## 2019-12-06 DIAGNOSIS — I10 ESSENTIAL HYPERTENSION: ICD-10-CM

## 2019-12-06 PROCEDURE — 99309 SBSQ NF CARE MODERATE MDM 30: CPT | Performed by: NURSE PRACTITIONER

## 2019-12-06 RX ORDER — MIRTAZAPINE 15 MG/1
7.5 TABLET, FILM COATED ORAL NIGHTLY
COMMUNITY

## 2019-12-06 RX ORDER — TRAMADOL HYDROCHLORIDE 50 MG/1
50 TABLET ORAL 2 TIMES DAILY
COMMUNITY
End: 2020-02-03

## 2019-12-06 RX ORDER — FAMOTIDINE 20 MG/1
20 TABLET, FILM COATED ORAL DAILY
COMMUNITY

## 2020-01-10 ENCOUNTER — OUTSIDE SERVICES (OUTPATIENT)
Dept: FAMILY MEDICINE CLINIC | Age: 80
End: 2020-01-10
Payer: MEDICARE

## 2020-01-10 VITALS
SYSTOLIC BLOOD PRESSURE: 124 MMHG | WEIGHT: 119.2 LBS | HEART RATE: 73 BPM | BODY MASS INDEX: 20.46 KG/M2 | RESPIRATION RATE: 16 BRPM | DIASTOLIC BLOOD PRESSURE: 68 MMHG | TEMPERATURE: 97.9 F | OXYGEN SATURATION: 95 %

## 2020-01-10 PROCEDURE — 99308 SBSQ NF CARE LOW MDM 20: CPT | Performed by: NURSE PRACTITIONER

## 2020-01-10 NOTE — PROGRESS NOTES
Apply topically 2 times daily.  magnesium hydroxide (MILK OF MAGNESIA) 400 MG/5ML suspension Take 30 mLs by mouth daily as needed for Constipation      montelukast (SINGULAIR) 10 MG tablet Take 10 mg by mouth nightly      sucralfate (CARAFATE) 1 GM tablet Take 1 g by mouth 2 times daily       traZODone (DESYREL) 50 MG tablet Take 50 mg by mouth nightly      triamcinolone (KENALOG) 0.1 % cream Apply topically nightly Apply topically 2 times daily.  sertraline (ZOLOFT) 100 MG tablet Take 100 mg by mouth daily       divalproex (DEPAKOTE SPRINKLE) 125 MG capsule Take 250 mg by mouth three times daily        No current facility-administered medications for this visit. Allergies   Allergen Reactions    Aricept [Donepezil Hcl]     Cardizem [Diltiazem Hcl]     Cardura [Doxazosin Mesylate]     Codeine     Doxycycline     Macrodantin [Nitrofurantoin Macrocrystal]     Sulfa Antibiotics        Health Maintenance   Topic Date Due    Potassium monitoring  1940    Creatinine monitoring  1940    DTaP/Tdap/Td vaccine (1 - Tdap) 01/11/1951    Shingles Vaccine (1 of 2) 01/11/1990    DEXA (modify frequency per FRAX score)  01/11/2005    Pneumococcal 65+ years Vaccine (1 of 1 - PPSV23) 01/11/2005    Flu vaccine (1) 09/01/2019       Subjective:      Review of Systems   Unable to perform ROS: Dementia       Objective:     Physical Exam  Vitals signs and nursing note reviewed. Constitutional:       General: She is not in acute distress. Appearance: She is well-developed. HENT:      Head: Normocephalic and atraumatic. Right Ear: External ear normal.      Left Ear: External ear normal.      Nose: Nose normal.   Eyes:      General: No scleral icterus. Right eye: No discharge. Left eye: No discharge. Conjunctiva/sclera: Conjunctivae normal.   Neck:      Musculoskeletal: Neck supple. Thyroid: No thyromegaly. Vascular: No JVD.    Cardiovascular:      Rate and insomnia     7. Other secondary osteoarthritis of multiple sites         Plan:     1. HTN- Chronic and overall stable. On no medications and blood pressures stable. 2. Alzheimer's dementia with anxiety- Chronic and agitation at times. Improved with lying down for meals. Continue Ativan, Zoloft, Depakote and Buspar. Mood is stable with last reduction of Zoloft. 3. Bullous pemphigous- Chronic and stable. Continue Cellcept. No skin issues. 4. Depression- Chronic and stable. Continue Zoloft. . Will continue to monitor. 5. GERD with hx of PUD- No complaints. Will continue Carafate and Pepcid. Weight improved on Remeron. 6. Allergic rhinitis-continue fexofenadline, singulair. 7. Insomnia- Chronic and stable. Continue trazodone. Will continue to monitor. 8. OA- Chronic and stable. Will continue Tylenol and Ultram.      Patient seen and examined. History partially obtained by chart review and nursing notes. I have reviewed patient's past medical, surgical, social, and family history and have made updates where appropriate.   See facility EMR for updated medication list.       Electronically signed by Jerrell Soulier, APRN - CNP on 1/10/2020 at 6:42 PM

## 2020-01-17 ENCOUNTER — OUTSIDE SERVICES (OUTPATIENT)
Dept: FAMILY MEDICINE CLINIC | Age: 80
End: 2020-01-17
Payer: MEDICARE

## 2020-01-17 VITALS
HEART RATE: 75 BPM | SYSTOLIC BLOOD PRESSURE: 104 MMHG | DIASTOLIC BLOOD PRESSURE: 47 MMHG | RESPIRATION RATE: 16 BRPM | TEMPERATURE: 98.2 F | WEIGHT: 123.4 LBS | BODY MASS INDEX: 21.18 KG/M2 | OXYGEN SATURATION: 93 %

## 2020-01-17 PROCEDURE — 99308 SBSQ NF CARE LOW MDM 20: CPT | Performed by: NURSE PRACTITIONER

## 2020-01-17 NOTE — PROGRESS NOTES
Tony Chavis is a [de-identified] y.o. female who presents today for her medical conditions/complaints as noted below. Chief Complaint   Patient presents with    Other     poor posture           HPI:     Ela Vásquez is seen today in her room. She is lying in bed. She has been working with therapy the last year with her wheelchair and they have established related to her poor sitting balance and poor eating ability that the use of harness during meals as needed in the adaptive wheelchair. This will only be utilized during meals and will be assessed by nursing if needed. She was observed this am while sitting up in the wheelchair and no support was needed during breakfast. This may vary from day today. She denied pain. She is declining overall. She is also being picked up on hospice today and they will also provide a radha recliner or another chair to see if this will assist with this. Current Outpatient Medications   Medication Sig Dispense Refill    traMADol (ULTRAM) 50 MG tablet Take 50 mg by mouth 2 times daily.  famotidine (PEPCID) 20 MG tablet Take 20 mg by mouth daily      mirtazapine (REMERON) 15 MG tablet Take 7.5 mg by mouth nightly      Acetaminophen 500 MG CAPS Take 1,000 mg by mouth three times daily       Menthol, Topical Analgesic, (BIOFREEZE) 4 % GEL Apply topically as needed      bisacodyl (DULCOLAX) 10 MG suppository Place 10 mg rectally daily as needed for Constipation      busPIRone (BUSPAR) 5 MG tablet Take 7.5 mg by mouth 3 times daily       mycophenolate (CELLCEPT) 500 MG tablet Take 500 mg by mouth 2 times daily      fexofenadine (ALLEGRA) 180 MG tablet Take 180 mg by mouth daily      hydrocortisone 1 % cream Apply topically daily Apply topically 2 times daily.       magnesium hydroxide (MILK OF MAGNESIA) 400 MG/5ML suspension Take 30 mLs by mouth daily as needed for Constipation      montelukast (SINGULAIR) 10 MG tablet Take 10 mg by mouth nightly      sucralfate (CARAFATE) 1 GM tablet Take 1 g by mouth 2 times daily       traZODone (DESYREL) 50 MG tablet Take 50 mg by mouth nightly      triamcinolone (KENALOG) 0.1 % cream Apply topically nightly Apply topically 2 times daily.  sertraline (ZOLOFT) 100 MG tablet Take 100 mg by mouth daily       divalproex (DEPAKOTE SPRINKLE) 125 MG capsule Take 250 mg by mouth three times daily        No current facility-administered medications for this visit. Allergies   Allergen Reactions    Aricept [Donepezil Hcl]     Cardizem [Diltiazem Hcl]     Cardura [Doxazosin Mesylate]     Codeine     Doxycycline     Macrodantin [Nitrofurantoin Macrocrystal]     Sulfa Antibiotics        Subjective:      Review of Systems   Unable to perform ROS: Dementia       Objective:     BP (!) 104/47   Pulse 75   Temp 98.2 °F (36.8 °C)   Resp 16   Wt 123 lb 6.4 oz (56 kg)   SpO2 93%   BMI 21.18 kg/m²     Physical Exam  Vitals signs and nursing note reviewed. Constitutional:       General: She is not in acute distress. Appearance: She is well-developed and underweight. HENT:      Head: Normocephalic and atraumatic. Eyes:      General: No scleral icterus. Right eye: No discharge. Left eye: No discharge. Cardiovascular:      Rate and Rhythm: Normal rate and regular rhythm. Pulmonary:      Effort: Pulmonary effort is normal. No respiratory distress. Breath sounds: Normal breath sounds. No wheezing. Abdominal:      General: Bowel sounds are normal. There is no distension. Palpations: Abdomen is soft. Tenderness: There is no tenderness. Musculoskeletal:      Cervical back: She exhibits decreased range of motion and deformity. Thoracic back: She exhibits decreased range of motion. Skin:     General: Skin is warm and dry. Neurological:      Mental Status: She is alert. She is disoriented. Psychiatric:         Speech: Speech is tangential.         Cognition and Memory: Cognition is impaired.  Memory is

## 2020-02-03 RX ORDER — TRAMADOL HYDROCHLORIDE 50 MG/1
TABLET ORAL
Qty: 60 TABLET | Refills: 5 | Status: SHIPPED | OUTPATIENT
Start: 2020-02-03 | End: 2020-03-04

## 2020-02-21 ENCOUNTER — OUTSIDE SERVICES (OUTPATIENT)
Dept: FAMILY MEDICINE CLINIC | Age: 80
End: 2020-02-21
Payer: MEDICARE

## 2020-02-21 VITALS
SYSTOLIC BLOOD PRESSURE: 111 MMHG | BODY MASS INDEX: 20.49 KG/M2 | DIASTOLIC BLOOD PRESSURE: 53 MMHG | HEART RATE: 89 BPM | RESPIRATION RATE: 16 BRPM | OXYGEN SATURATION: 95 % | WEIGHT: 119.4 LBS | TEMPERATURE: 98.3 F

## 2020-02-21 PROBLEM — G20 PARKINSON'S DISEASE (HCC): Status: ACTIVE | Noted: 2020-02-21

## 2020-02-21 PROBLEM — G20.A1 PARKINSON'S DISEASE: Status: ACTIVE | Noted: 2020-02-21

## 2020-02-21 PROBLEM — M19.90 CHRONIC OSTEOARTHRITIS: Status: ACTIVE | Noted: 2020-02-21

## 2020-02-21 PROCEDURE — 99309 SBSQ NF CARE MODERATE MDM 30: CPT | Performed by: FAMILY MEDICINE

## 2020-02-21 NOTE — PROGRESS NOTES
Exam  Vitals signs and nursing note reviewed. Constitutional:       General: She is not in acute distress. Appearance: She is well-developed and underweight. HENT:      Head: Normocephalic and atraumatic. Eyes:      General: No scleral icterus. Right eye: No discharge. Left eye: No discharge. Cardiovascular:      Rate and Rhythm: Normal rate and regular rhythm. Pulmonary:      Effort: Pulmonary effort is normal. No respiratory distress. Breath sounds: Normal breath sounds. No wheezing. Abdominal:      General: Bowel sounds are normal. There is no distension. Palpations: Abdomen is soft. Tenderness: There is no abdominal tenderness. Musculoskeletal:         General: No deformity. Cervical back: She exhibits decreased range of motion. Thoracic back: She exhibits decreased range of motion. Skin:     General: Skin is warm and dry. Neurological:      Mental Status: She is alert. Mental status is at baseline. She is disoriented. Psychiatric:         Speech: Speech is tangential.         Cognition and Memory: Cognition is impaired. Memory is impaired. She exhibits impaired recent memory and impaired remote memory. Judgment: Judgment is impulsive. BP (!) 111/53   Pulse 89   Temp 98.3 °F (36.8 °C)   Resp 16   Wt 119 lb 6.4 oz (54.2 kg)   SpO2 95%   BMI 20.49 kg/m²     Assessment/Plan      1. Essential hypertension   No current medication. Continue to monitor VS.   2. Late onset Alzheimer's disease with behavioral disturbance (HCC)   Stable. One fall in past month. Continue current meds. On hospice and monitor weights. Anticipate decline. 3. Bullous pemphigoid   Will reduce Cell cept and attempt dc. 4. Moderate episode of recurrent major depressive disorder (HCC)   Declining overall. Continue Zoloft. 5. Gastroesophageal reflux disease without esophagitis   Denies Carafate and pepcid   6.  Non-seasonal allergic rhinitis due to other allergic trigger   Continue Carafate and pepcid. 7. Other insomnia   Continue Trazodone and monitor sleep. 8. Chronic osteoarthritis   Continue Ultram and Tylenol. Denies pain. Patient seen and examined. History partially obtained by chart review and nursing notes. I have reviewed patient's past medical, surgical, social, and family history and have made updates where appropriate.   See facility EMR for updated medication list.       Electronically signed by Roni Haddad MD on 2/21/2020 at 1:53 PM

## 2020-03-05 RX ORDER — LORAZEPAM 0.5 MG/1
0.5 TABLET ORAL 2 TIMES DAILY
Qty: 60 TABLET | Refills: 2 | Status: SHIPPED | OUTPATIENT
Start: 2020-03-05 | End: 2020-06-22

## 2020-03-05 RX ORDER — LORAZEPAM 0.5 MG/1
TABLET ORAL
COMMUNITY
Start: 2020-03-05 | End: 2020-03-05 | Stop reason: SDUPTHER

## 2020-04-20 ENCOUNTER — OUTSIDE SERVICES (OUTPATIENT)
Dept: FAMILY MEDICINE CLINIC | Age: 80
End: 2020-04-20
Payer: MEDICARE

## 2020-04-20 VITALS
DIASTOLIC BLOOD PRESSURE: 53 MMHG | RESPIRATION RATE: 16 BRPM | TEMPERATURE: 97.8 F | HEART RATE: 71 BPM | WEIGHT: 128 LBS | OXYGEN SATURATION: 93 % | BODY MASS INDEX: 21.97 KG/M2 | SYSTOLIC BLOOD PRESSURE: 134 MMHG

## 2020-04-20 PROCEDURE — 99490 CHRNC CARE MGMT STAFF 1ST 20: CPT | Performed by: NURSE PRACTITIONER

## 2020-04-20 PROCEDURE — 99309 SBSQ NF CARE MODERATE MDM 30: CPT | Performed by: NURSE PRACTITIONER

## 2020-05-18 ENCOUNTER — OUTSIDE SERVICES (OUTPATIENT)
Dept: FAMILY MEDICINE CLINIC | Age: 80
End: 2020-05-18
Payer: MEDICARE

## 2020-05-18 VITALS
SYSTOLIC BLOOD PRESSURE: 105 MMHG | DIASTOLIC BLOOD PRESSURE: 50 MMHG | BODY MASS INDEX: 22.18 KG/M2 | TEMPERATURE: 97.5 F | RESPIRATION RATE: 16 BRPM | OXYGEN SATURATION: 94 % | WEIGHT: 129.2 LBS | HEART RATE: 65 BPM

## 2020-05-18 PROCEDURE — 99490 CHRNC CARE MGMT STAFF 1ST 20: CPT | Performed by: FAMILY MEDICINE

## 2020-05-18 PROCEDURE — 99309 SBSQ NF CARE MODERATE MDM 30: CPT | Performed by: FAMILY MEDICINE

## 2020-05-18 NOTE — PROGRESS NOTES
Coloration: Skin is not jaundiced. Neurological:      Mental Status: She is alert and oriented to person, place, and time. Psychiatric:         Attention and Perception: She is inattentive. Mood and Affect: Affect is labile. Speech: She is noncommunicative. Behavior: Behavior is slowed and withdrawn. Cognition and Memory: Cognition is impaired. Judgment: Judgment is impulsive. ASSESSMENT & PLAN  Estee Valle was seen today for follow up of chronic conditions. Diagnoses and all orders for this visit:    Late onset Alzheimer's disease with behavioral disturbance ([facility]  -assistance with adls. Reminders given, safety precautions  Bullous pemphigoid  -doing well. Recently discontinued cellcept  Essential hypertension  -chronic, controlled, not on any medications currently  Gastroesophageal reflux disease without esophagitis  -No signs or symptoms at this time. Continue Pepcid  Non-seasonal allergic rhinitis due to other allergic trigger  -Chronic, currently without meds  Other insomnia  -Chronic and controlled, Remeron in place  Parkinson's disease ([facility]  -Assistance with ADLs and safety precautions  Moderate episode of recurrent major depressive disorder ([facility]  -Worsening dementia but no signs of depressed mood. Continue current regimen  Chronic osteoarthritis  -Tylenol and tramadol as needed pain      Will follow up monthly or prn  Resident has alzheimer's disease, HTN, MDD, Parkinson's disease and it is expected to last 12 or more months. These chronic conditions place resident at a significant risk of death, acute exacerbation, decline in function or functional decline. The patient's comprehensive plan was monitored today. I spent 20 minutes reviewing plan. I have seen and examined the patient virtually and chaperone was present. The history was obtained through the patient, past medical records, and the staff. The patient's chart was updated as appropriate.

## 2020-06-12 ENCOUNTER — OUTSIDE SERVICES (OUTPATIENT)
Dept: FAMILY MEDICINE CLINIC | Age: 80
End: 2020-06-12
Payer: MEDICARE

## 2020-06-12 VITALS
TEMPERATURE: 97.9 F | HEART RATE: 68 BPM | DIASTOLIC BLOOD PRESSURE: 59 MMHG | OXYGEN SATURATION: 90 % | WEIGHT: 131.4 LBS | SYSTOLIC BLOOD PRESSURE: 105 MMHG | RESPIRATION RATE: 16 BRPM | BODY MASS INDEX: 22.55 KG/M2

## 2020-06-12 PROCEDURE — 99490 CHRNC CARE MGMT STAFF 1ST 20: CPT | Performed by: NURSE PRACTITIONER

## 2020-06-12 PROCEDURE — 99309 SBSQ NF CARE MODERATE MDM 30: CPT | Performed by: NURSE PRACTITIONER

## 2020-06-22 RX ORDER — LORAZEPAM 0.5 MG/1
TABLET ORAL
Qty: 60 TABLET | Refills: 2 | Status: SHIPPED | OUTPATIENT
Start: 2020-06-22 | End: 2020-07-22

## 2020-07-17 ENCOUNTER — OUTSIDE SERVICES (OUTPATIENT)
Dept: FAMILY MEDICINE CLINIC | Age: 80
End: 2020-07-17
Payer: MEDICARE

## 2020-07-17 VITALS
TEMPERATURE: 97 F | WEIGHT: 131.4 LBS | SYSTOLIC BLOOD PRESSURE: 124 MMHG | OXYGEN SATURATION: 94 % | RESPIRATION RATE: 16 BRPM | HEART RATE: 74 BPM | DIASTOLIC BLOOD PRESSURE: 82 MMHG | BODY MASS INDEX: 22.55 KG/M2

## 2020-07-17 PROCEDURE — 99490 CHRNC CARE MGMT STAFF 1ST 20: CPT | Performed by: FAMILY MEDICINE

## 2020-07-17 PROCEDURE — 99309 SBSQ NF CARE MODERATE MDM 30: CPT | Performed by: FAMILY MEDICINE

## 2020-07-17 NOTE — PROGRESS NOTES
Christina Walls is a [de-identified] y.o. female who presents today for her medical conditions/complaints as noted below. Chief Complaint   Patient presents with    Other     Monthly follow for chronic medical conditions           HPI:     Miley Núñez is an [de-identified] y/o hospice patient seen this morning as a follow up for her chronic medical conditions. This morning she is laying in bed on her right side in the fetal position. She appears agitated and complains of pain, but is unable to provide a reason or description due to her dementia. Per nursing staff, she has been more agitated for the past 2 weeks. She has been eating well. No issues with urinating or stooling. Past Medical History:   Diagnosis Date    Allergic rhinitis     Alzheimer's disease ([address]     Anxiety disorder     Bullous pemphigoid     Dysphagia     GERD (gastroesophageal reflux disease)     HLD (hyperlipidemia)     HTN (hypertension)     MDD (major depressive disorder)     Meniere's disease     [facility]'s [facility]     Polyosteoarthritis       No past surgical history on file.     Family History   Family history unknown: Yes       Social History     Tobacco Use    Smoking status: Never Smoker    Smokeless tobacco: Never Used   Substance Use Topics    Alcohol use: Never     Frequency: Never      Allergies   Allergen Reactions    Aricept [Donepezil Hcl]     Cardizem [Diltiazem Hcl]     Cardura [Doxazosin Mesylate]     Codeine     Doxycycline     Macrodantin [Nitrofurantoin Macrocrystal]     Sulfa Antibiotics        Health Maintenance   Topic Date Due    Potassium monitoring  1940    Creatinine monitoring  1940    DTaP/Tdap/Td vaccine (1 - Tdap) 01/11/1959    Shingles Vaccine (1 of 2) 01/11/1990    DEXA (modify frequency per FRAX score)  01/11/1995    Pneumococcal 65+ years Vaccine (1 of 1 - PPSV23) 01/11/2005    Flu vaccine (1) 09/01/2020    Hepatitis A vaccine  Aged Out    Hepatitis B vaccine  Aged Out    Hib vaccine Aged Out    Meningococcal (ACWY) vaccine  Aged Out       Subjective:      Review of Systems Unable to obtain secondary to dementia    Objective:     Physical Exam  Constitutional:       Appearance: She is not ill-appearing. HENT:      Head: Normocephalic and atraumatic. Nose: Nose normal. No rhinorrhea. Eyes:      General: No scleral icterus. Conjunctiva/sclera: Conjunctivae normal.   Neck:      Musculoskeletal: Neck supple. No neck rigidity. Cardiovascular:      Rate and Rhythm: Normal rate and regular rhythm. Pulses: Normal pulses. Heart sounds: Normal heart sounds. Pulmonary:      Effort: Pulmonary effort is normal.      Breath sounds: Normal breath sounds. No wheezing. Abdominal:      Palpations: Abdomen is soft. Tenderness: There is no abdominal tenderness. Musculoskeletal:         General: No swelling or tenderness. Skin:     General: Skin is warm. Findings: No rash. Neurological:      General: No focal deficit present. Mental Status: She is alert. She is disoriented. Psychiatric:      Comments: Appears agitated       /82   Pulse 74   Temp 97 °F (36.1 °C)   Resp 16   Wt 131 lb 6.4 oz (59.6 kg)   SpO2 94%   BMI 22.55 kg/m²     Assessment:       1. Late onset Alzheimer's disease with behavioral disturbance ([facility]   - Hospice Care  - Continue supportive Medications  -  Monitor for falls  - Weight stable at 131lbs   2. Gastroesophageal reflux disease without esophagitis   - Weight stable at 131lbs  - Continue low dose carafate  - Increase Pepcid to 20mg BID, before breakfast and dinner   3. Essential hypertension   - Controlled without meds  - Continue to monitor   4. Bullous pemphigoid   - Stable without meds. No new lesions   5. Chronic osteoarthritis   - Controlled with tylenol, ultram, and biofreeze    6.  Moderate episode of recurrent major depressive disorder (HCC)  - Appears more agitated  - Increase Buspirone to 10mg TID  - Continue Ativan, and depakote at current doses       Resident has Alzheimers disease, HTN, MDD, and it is expected to last 15 or more months. These chronic conditions place resident at a significant risk of death, acute exacerbation, or functional decline. The patient's comprehensive plan was monitored today. I spent 20 minutes reviewing plan. Patient seen and examined. History partially obtained by chart review and nursing notes. I have reviewed patient's past medical, surgical, social, and family history and have made updates where appropriate.   See facility EMR for updated medication list.     Electronically signed by Agata Johnson MD on 7/17/2020 at 12:16 PM       Attending Sumit Kaplan  I was present with the resident physician during the history and exam. I discussed the findings and plans with the resident physician and agree as documented in his note     Electronically signed by Agata Johnson MD on 7/17/20 at 12:18 PM EDT

## 2020-07-26 RX ORDER — TRAMADOL HYDROCHLORIDE 50 MG/1
TABLET ORAL
Qty: 60 TABLET | Refills: 5 | Status: SHIPPED | OUTPATIENT
Start: 2020-07-26 | End: 2020-08-25

## 2020-08-21 ENCOUNTER — OUTSIDE SERVICES (OUTPATIENT)
Dept: FAMILY MEDICINE CLINIC | Age: 80
End: 2020-08-21
Payer: MEDICARE

## 2020-08-21 VITALS
TEMPERATURE: 98 F | WEIGHT: 136 LBS | HEART RATE: 74 BPM | OXYGEN SATURATION: 91 % | DIASTOLIC BLOOD PRESSURE: 57 MMHG | SYSTOLIC BLOOD PRESSURE: 109 MMHG | BODY MASS INDEX: 23.34 KG/M2 | RESPIRATION RATE: 16 BRPM

## 2020-08-21 PROCEDURE — 99490 CHRNC CARE MGMT STAFF 1ST 20: CPT | Performed by: FAMILY MEDICINE

## 2020-08-21 PROCEDURE — 99309 SBSQ NF CARE MODERATE MDM 30: CPT | Performed by: FAMILY MEDICINE

## 2020-08-21 NOTE — PROGRESS NOTES
Brook Romero is a [de-identified] y.o. female who presents today for her medical conditions/complaints as noted below. Chief Complaint   Patient presents with    Other     Monthly follow up for chronic medical conditions           HPI:     Thornton Baumgarten is an [de-identified] y/o hospice patient seen this morning as a follow up for her chronic medical conditions. This morning she is laying comfortably in bed on her right side. She denies any pain. Per nursing staff, she has been feeling better and less agitated since increasing her buspar to 10mg BID and increasing ultram to 50mg TID. She has been eating well. No issues with urinating or stooling. Past Medical History:   Diagnosis Date    Allergic rhinitis     Alzheimer's disease ([address]     Anxiety disorder     Bullous pemphigoid     Dysphagia     GERD (gastroesophageal reflux disease)     HLD (hyperlipidemia)     HTN (hypertension)     MDD (major depressive disorder)     Meniere's disease     [facility]     Polyosteoarthritis       No past surgical history on file.     Family History   Family history unknown: Yes       Social History     Tobacco Use    Smoking status: Never Smoker    Smokeless tobacco: Never Used   Substance Use Topics    Alcohol use: Never     Frequency: Never      Allergies   Allergen Reactions    Aricept [Donepezil Hcl]     Cardizem [Diltiazem Hcl]     Cardura [Doxazosin Mesylate]     Codeine     Doxycycline     Macrodantin [Nitrofurantoin Macrocrystal]     Sulfa Antibiotics        Health Maintenance   Topic Date Due    Potassium monitoring  1940    Creatinine monitoring  1940    DTaP/Tdap/Td vaccine (1 - Tdap) 01/11/1959    Shingles Vaccine (1 of 2) 01/11/1990    DEXA (modify frequency per FRAX score)  01/11/1995    Pneumococcal 65+ years Vaccine (1 of 1 - PPSV23) 01/11/2005    Flu vaccine (1) 09/01/2020    Hepatitis A vaccine  Aged Out    Hepatitis B vaccine  Aged Out    Hib vaccine  Aged C/ Chris Autumn 19 Meningococcal (ACWY) vaccine  Aged Out       Subjective:      Review of Systems Unable to obtain secondary to dementia    Objective:     Physical Exam  Constitutional:       Appearance: She is not ill-appearing. HENT:      Head: Normocephalic and atraumatic. Nose: Nose normal. No rhinorrhea. Eyes:      General: No scleral icterus. Conjunctiva/sclera: Conjunctivae normal.   Neck:      Musculoskeletal: Neck supple. No neck rigidity. Cardiovascular:      Rate and Rhythm: Normal rate and regular rhythm. Pulses: Normal pulses. Heart sounds: Normal heart sounds. Pulmonary:      Effort: Pulmonary effort is normal.      Breath sounds: Normal breath sounds. No wheezing. Abdominal:      Palpations: Abdomen is soft. Tenderness: There is no abdominal tenderness. Musculoskeletal:         General: No swelling or tenderness. Skin:     General: Skin is warm. Findings: No rash. Neurological:      General: No focal deficit present. Mental Status: She is alert. She is disoriented. Psychiatric:      Comments: Appears agitated       BP: 109/57 mmHg   8/20/2020 23:47     Temp:98 °F   8/21/2020 14:41     Pulse: 74 bpm   8/20/2020 23:47     Weight: 136 Lbs   8/11/2020 14:07     Resp: 16 Breaths/min   8/20/2020 23:47     O2: 91 %   8/20/2020 23:47     Pain: 0     Assessment:   Jessi Reach seen this evening for follow up of her chronic medical conditions. Doing well. Mild hypotension with a BP of 109/57. She has a history of HTN, but has been normotensive without medications. The patient is not mobile and remains in her bed. Will continue to monitor. Intervention not indicated at this time. 1. Late onset Alzheimer's disease with behavioral disturbance ([facility]   - Hospice Care  - Continue supportive Medications  -  Monitor for falls  - Weight stable at 131lbs   2.  Gastroesophageal reflux disease without esophagitis   - Weight stable at 131lbs  - Continue low dose carafate  - Controlled on Pepcid 20mg BID, before breakfast and dinner   3. Essential hypertension   - Controlled without meds  - BP today was 109/57. Mildly hypotensive. Not mobile. No risk of falls. No interventions at this time   - Continue to monitor   4. Bullous pemphigoid   - Stable without meds. No new lesions   5. Chronic osteoarthritis   - Controlled with tylenol, ultram, and biofreeze    6. Moderate episode of recurrent major depressive disorder (HCC)  - Appears comfortable and pleasant  - Continue Buspirone to 10mg TID  - Continue Ativan, and depakote at current doses        Resident has Alzheimers disease, HTN, MDD, and it is expected to last 12 or more months. These chronic conditions place resident at a significant risk of death, acute exacerbation, or functional decline. The patient's comprehensive plan was monitored today. I spent 20 minutes reviewing plan. Patient seen and examined. History partially obtained by chart review and nursing notes. I have reviewed patient's past medical, surgical, social, and family history and have made updates where appropriate.   See facility EMR for updated medication list.     Electronically signed by Ching Temple MD on 8/21/2020 at 4:08 PM       Attending Sumit Kaplan  I was present with the resident physician during the history and exam. I discussed the findings and plans with the resident physician and agree as documented in his note     Electronically signed by Ching Temple MD on 7/17/20 at 12:18 PM EDT

## 2020-09-25 ENCOUNTER — OUTSIDE SERVICES (OUTPATIENT)
Dept: FAMILY MEDICINE CLINIC | Age: 80
End: 2020-09-25
Payer: MEDICARE

## 2020-09-25 VITALS
BODY MASS INDEX: 23.6 KG/M2 | SYSTOLIC BLOOD PRESSURE: 114 MMHG | WEIGHT: 137.5 LBS | DIASTOLIC BLOOD PRESSURE: 63 MMHG | OXYGEN SATURATION: 95 % | RESPIRATION RATE: 14 BRPM | TEMPERATURE: 97 F | HEART RATE: 72 BPM

## 2020-09-25 PROCEDURE — 99490 CHRNC CARE MGMT STAFF 1ST 20: CPT | Performed by: FAMILY MEDICINE

## 2020-09-25 PROCEDURE — 99309 SBSQ NF CARE MODERATE MDM 30: CPT | Performed by: FAMILY MEDICINE

## 2020-09-25 NOTE — PROGRESS NOTES
 DEXA (modify frequency per FRAX score)  01/11/1995    Pneumococcal 65+ years Vaccine (1 of 1 - PPSV23) 01/11/2005    Flu vaccine (1) 09/01/2020    Hepatitis A vaccine  Aged Out    Hepatitis B vaccine  Aged Out    Hib vaccine  Aged Out    Meningococcal (ACWY) vaccine  Aged Out       Subjective:      Review of Systems   Unable to perform ROS: Dementia       Objective:     Physical Exam  Vitals signs and nursing note reviewed. Exam conducted with a chaperone present. Constitutional:       General: She is not in acute distress. Appearance: Normal appearance. She is not ill-appearing. HENT:      Head: Normocephalic and atraumatic. Right Ear: Hearing normal.      Left Ear: Hearing normal.      Nose: Nose normal.   Eyes:      General: Lids are normal.   Neck:      Musculoskeletal: Normal range of motion and neck supple. Cardiovascular:      Rate and Rhythm: Normal rate and regular rhythm. Heart sounds: Normal heart sounds, S1 normal and S2 normal.   Pulmonary:      Effort: Pulmonary effort is normal. No tachypnea or bradypnea. Breath sounds: Normal breath sounds. Abdominal:      General: Abdomen is flat. Bowel sounds are normal.      Palpations: Abdomen is soft. Tenderness: There is no abdominal tenderness. Musculoskeletal:      Right lower leg: No edema. Left lower leg: No edema. Skin:     General: Skin is warm and dry. Neurological:      Mental Status: She is alert. Mental status is at baseline. She is disoriented. Psychiatric:         Mood and Affect: Mood normal.         Speech: Speech is delayed. Behavior: Behavior is cooperative. Cognition and Memory: Memory is impaired. She exhibits impaired recent memory and impaired remote memory. /63   Pulse 72   Temp 97 °F (36.1 °C)   Resp 14   Wt 137 lb 8 oz (62.4 kg)   SpO2 95%   BMI 23.60 kg/m²     Assessment/Plan      1.  Late onset Alzheimer's disease with behavioral disturbance ([facility] [address] - chronic and remains on hospice care. No falls in the past month. Continue Depakote, Remeron, Buspar, and Ativan. 2. Moderate episode of recurrent major depressive disorder (HCC) - Mood stable. Has some crying episodes from time to time, but this is improved. Continue Remeron and Sertraline. 3. Gastroesophageal reflux disease without esophagitis - weight improved with remeron. Continue Pepcid and Carafate. 4. Essential hypertension - overall stable. Continue to monitor with no current medications. 5. Chronic osteoarthritis - pain controlled with Biofreeze, Ultram, and Tylenol. 6. Bullous pemphigoid - Skin remains stable. No current medications. 7. Non-seasonal allergic rhinitis due to other allergic trigger - chronic and remains stable. Continue to monitor. 8. Parkinson's disease ([facility] [address] chronic and no current medications. Monitor. 9 Other insomnia - chronic and continue Remeron. Resident has Alzheimer's dementia, HTN, PD and it is expected to last 15 or more months. These chronic conditions place resident at a significant risk of death, acute exacerbation, or functional decline. The patient's comprehensive plan was monitored today. I spent 20 minutes reviewing plan. Patient seen and examined. History partially obtained by chart review and nursing notes. I have reviewed patient's past medical, surgical, social, and family history and have made updates where appropriate.   See facility EMR for updated medication list.       Electronically signed by Lawrence Bernstein MD on 9/25/2020 at 12:05 PM

## 2020-10-29 ENCOUNTER — OUTSIDE SERVICES (OUTPATIENT)
Dept: FAMILY MEDICINE CLINIC | Age: 80
End: 2020-10-29
Payer: MEDICARE

## 2020-10-29 VITALS
DIASTOLIC BLOOD PRESSURE: 54 MMHG | TEMPERATURE: 98 F | BODY MASS INDEX: 23.48 KG/M2 | WEIGHT: 136.8 LBS | RESPIRATION RATE: 16 BRPM | SYSTOLIC BLOOD PRESSURE: 106 MMHG | HEART RATE: 67 BPM | OXYGEN SATURATION: 93 %

## 2020-10-29 PROCEDURE — 99490 CHRNC CARE MGMT STAFF 1ST 20: CPT | Performed by: FAMILY MEDICINE

## 2020-10-29 PROCEDURE — 99309 SBSQ NF CARE MODERATE MDM 30: CPT | Performed by: FAMILY MEDICINE

## 2020-10-30 NOTE — PROGRESS NOTES
Sandeep Carmen is a [de-identified] y.o. female that presents for Other (Monthly follow-up of chronic conditions)      This visit was performed via synchronous telecommunication system. Patient is located in the SNF  I am located in my office    HPI:  Patient is seen and examined today through telehealth evaluation with the assistance of certified nurse practitioner. She is seen resting comfortably in her bed. She is a alert and does make eye contact but communicates very little. She has diagnosis of Alzheimer's disease and is on hospice care. Her appetite has been okay and her weight is stable at 136 pounds this month. She has had no recent falls. No new behaviors per staff. She also has history of hypertension, bullous pemphigoid, major depressive disorder, GERD, osteoarthritis, insomnia. Blood pressure has been overall controlled on her current regimen. GERD is controlled with Pepcid and Carafate. Mood has been overall stable on current regimen. She does have arthritis which is controlled with Ultram and Tylenol. No other concerns per staff. I have reviewed the patient's past medical history, past surgical history, allergies,medications, social and family history and I have made updates where appropriate. Past Medical History:   Diagnosis Date    Allergic rhinitis     Alzheimer's disease ([facility] [address]     Anxiety disorder     Bullous pemphigoid     Dysphagia     GERD (gastroesophageal reflux disease)     HLD (hyperlipidemia)     HTN (hypertension)     MDD (major depressive disorder)     Meniere's disease     Parkinson's disease ([facility] [address]     Polyosteoarthritis        No past surgical history on file.     Social History     Tobacco Use    Smoking status: Never Smoker    Smokeless tobacco: Never Used   Substance Use Topics    Alcohol use: Never     Frequency: Never    Drug use: Not on file       Family History   Family history unknown: Yes         Review of Systems   Unable to perform ROS: Dementia PHYSICAL EXAM:  Vitals:    10/29/20 2107   BP: (!) 106/54   Pulse: 67   Resp: 16   Temp: 98 °F (36.7 °C)   SpO2: 93%        Physical Exam  Vitals signs and nursing note reviewed. Exam conducted with a chaperone present. Constitutional:       General: She is not in acute distress. Appearance: Normal appearance. HENT:      Head: Normocephalic and atraumatic. Eyes:      General: No scleral icterus. Pulmonary:      Effort: Pulmonary effort is normal. No respiratory distress. Skin:     Coloration: Skin is not jaundiced. Neurological:      Mental Status: She is alert and oriented to person, place, and time. Psychiatric:         Attention and Perception: She is inattentive. Mood and Affect: Affect is flat. Speech: She is noncommunicative. Behavior: Behavior is slowed and withdrawn. Cognition and Memory: Cognition is impaired. Memory is impaired. ASSESSMENT & PLAN  Дмитрий Sierra was seen today for other. Diagnoses and all orders for this visit:    Late onset Alzheimer's disease with behavioral disturbance ([facility] [address]  -Chronic and remains on hospice care. Continue Depakote, Remeron, BuSpar, Ativan  Moderate episode of recurrent major depressive disorder (HCC)  -Chronic and overall stable. Has had less episodes of crying per staff. Gastroesophageal reflux disease without esophagitis  Controlled on Pepcid and Carafate. Continue to monitor for signs or symptoms of heartburn. Essential hypertension  -Blood pressures within normal limits and currently not on any medications. Continue to monitor for hypotension. Bullous pemphigoid  -Continue to monitor skin, no current issues. Chronic osteoarthritis  -Monitor for signs and symptoms of pain. Ultram Tylenol and Biofreeze as needed. Other insomnia  -Remeron to help with sleep and mood      No follow-ups on file.     Resident has alzheimer's dementia, HTN, MDD, bullous pemphigoid and it is expected to last 12 or more months or until natural death. These chronic conditions place resident at a significant risk of death, acute exacerbation, decline in function or functional decline. The patient's comprehensive plan was monitored today. I spent 20 minutes reviewing plan. I have seen and examined the patient virtually and chaperone was present. The history was obtained through the patient, past medical records, and the staff. The patient's chart was updated as appropriate. Please see facility EMR for complete medication reconciliation. Pursuant to the emergency declaration under the [address] [place] [address], [facility] waiver authority and the [employer] Act, this Virtual  Visit was conducted, with patient's consent, to reduce the patient's risk of exposure to COVID-19 and provide continuity of care for an established patient. Services were provided through a video synchronous discussion virtually to substitute for in-person SNF visit.     Electronically signed by Evelyn Bradford MD on 10/29/2020 at 9:08 PM

## 2020-11-09 RX ORDER — TRAMADOL HYDROCHLORIDE 50 MG/1
TABLET ORAL
Qty: 90 TABLET | Refills: 2 | Status: SHIPPED | OUTPATIENT
Start: 2020-11-09 | End: 2020-12-09

## 2020-11-20 ENCOUNTER — OUTSIDE SERVICES (OUTPATIENT)
Dept: FAMILY MEDICINE CLINIC | Age: 80
End: 2020-11-20
Payer: MEDICARE

## 2020-11-20 VITALS
HEART RATE: 70 BPM | WEIGHT: 142 LBS | HEIGHT: 64 IN | DIASTOLIC BLOOD PRESSURE: 54 MMHG | RESPIRATION RATE: 16 BRPM | TEMPERATURE: 97.9 F | OXYGEN SATURATION: 96 % | SYSTOLIC BLOOD PRESSURE: 97 MMHG | BODY MASS INDEX: 24.24 KG/M2

## 2020-11-20 PROCEDURE — 99490 CHRNC CARE MGMT STAFF 1ST 20: CPT | Performed by: FAMILY MEDICINE

## 2020-11-20 PROCEDURE — 99309 SBSQ NF CARE MODERATE MDM 30: CPT | Performed by: FAMILY MEDICINE

## 2020-11-20 NOTE — PROGRESS NOTES
Nico Padilla is a [de-identified] y.o. female that presents for Other (Monthly follow up of chronic medical conditions)      This visit was performed via synchronous telecommunication system. Patient is located in the SNF  I am located in my office    HPI:    Linda Marie is diagnosed with Alzheimers disease and is on hospice care. She also has a history of HTN, bullous pemphigoid, MDD, GERD, osteoarthritis, and insomnia. She is seen and examined today via telehealth with the assistance of a certified nurse practitioner. She is awake and alert but does not communicate very much. She makes eye contact. When asked if she is experiencing any pain she does not respond. When the nurse practitioner checks her lower extremity edema, she screams theyre fine.  She has been eating well and has gained 6lbs since last month. She has not fallen. Most recent blood pressures have been borderline low, but stable. She is bed bound. She is not standing up, walking around, getting dizzy, or falling. Her mood has been stable on current regimen. GERD controlled with Pepcid and Carafate. Arthritis pain controlled with Ultram and Tylenol. No other concerns per staff. I have reviewed the patient's past medical history, past surgical history, allergies,medications, social and family history and I have made updates where appropriate. Past Medical History:   Diagnosis Date    Allergic rhinitis     Alzheimer's disease ([facility] [address]     Anxiety disorder     Bullous pemphigoid     Dysphagia     GERD (gastroesophageal reflux disease)     HLD (hyperlipidemia)     HTN (hypertension)     MDD (major depressive disorder)     Meniere's disease     Parkinson's disease ([facility] [address]     Polyosteoarthritis        No past surgical history on file.     Social History     Tobacco Use    Smoking status: Never Smoker    Smokeless tobacco: Never Used   Substance Use Topics    Alcohol use: Never     Frequency: Never    Drug use: Not on file       Family History   Family history unknown: Yes         Review of Systems   Unable to perform ROS: Dementia           PHYSICAL EXAM:  Vitals:    11/20/20 1618   BP: (!) 97/54   Pulse: 70   Resp: 16   Temp: 97.9 °F (36.6 °C)   SpO2: 96%        Physical Exam  Vitals signs and nursing note reviewed. Constitutional:       General: She is not in acute distress. Appearance: Normal appearance. She is normal weight. HENT:      Head: Normocephalic and atraumatic. Right Ear: External ear normal.      Left Ear: External ear normal.      Nose: No rhinorrhea. Eyes:      General: No scleral icterus. Extraocular Movements: Extraocular movements intact. Neck:      Musculoskeletal: Normal range of motion. Pulmonary:      Effort: Pulmonary effort is normal. No respiratory distress. Skin:     Findings: No rash. Neurological:      Mental Status: She is alert. She is disoriented. Psychiatric:         Attention and Perception: She is inattentive. Mood and Affect: Affect is flat. Speech: She is noncommunicative. Behavior: Behavior is withdrawn. ASSESSMENT & PLAN  Saud Gupta was seen today for monthly follow up of chronic medical conditions. Diagnoses and all orders for this visit:    Late onset Alzheimer's disease with behavioral disturbance ([facility] [address]  - Chronic. Currently on hospice. Continue current regimen of depakote, remeron, buspar, and ativan    Moderate episode of recurrent major depressive disorder ([address]  - Chronic and stable on current psychotropics. Will continue     Gastroesophageal reflux disease without esophagitis  - Stable on pepcid and carafate. Will continue    Essential hypertension  - Stable without meds. Most recent BP of 97/54. Patient's DBP hovers around the 50s. She is not getting up, walking, getting dizzy, and falling. Continue to monitor. If develops significant hypotension or new symptoms, consider starting midodrine     Chronic osteoarthritis  - Controlled on Ultram and tylenol. Continue    Other insomnia  - Stable on remeron. Continue    Bullous pemphigoid  - Stable. No skin issues at this time. Continue to monitor skin      No follow-ups on file. Resident has Alzheimer's dementia, GERD, HTN, Arthritis, MDD and it is expected to last 15 or more months. These chronic conditions place resident at a significant risk of death, acute exacerbation, decline in function or functional decline. The patient's comprehensive plan was monitored today. I spent 20 minutes reviewing plan. I have seen and examined the patient virtually and chaperone was present. The history was obtained through the patient, past medical records, and the staff. The patient's chart was updated as appropriate. Please see facility EMR for complete medication reconciliation. Pursuant to the emergency declaration under the [facility], [facility] waiver authority and the [employer] Act, this Virtual  Visit was conducted, with patient's consent, to reduce the patient's risk of exposure to COVID-19 and provide continuity of care for an established patient. Services were provided through a video synchronous discussion virtually to substitute for in-person SNF visit.     Electronically signed by Junaid Brink MD on 11/20/2020 at 4:47 PM

## 2020-12-18 ENCOUNTER — OUTSIDE SERVICES (OUTPATIENT)
Dept: FAMILY MEDICINE CLINIC | Age: 80
End: 2020-12-18
Payer: MEDICARE

## 2020-12-18 VITALS
WEIGHT: 146 LBS | SYSTOLIC BLOOD PRESSURE: 114 MMHG | DIASTOLIC BLOOD PRESSURE: 72 MMHG | HEART RATE: 78 BPM | BODY MASS INDEX: 25.06 KG/M2 | OXYGEN SATURATION: 96 % | TEMPERATURE: 97.1 F | RESPIRATION RATE: 14 BRPM

## 2020-12-18 PROCEDURE — 99309 SBSQ NF CARE MODERATE MDM 30: CPT | Performed by: FAMILY MEDICINE

## 2020-12-18 PROCEDURE — 99490 CHRNC CARE MGMT STAFF 1ST 20: CPT | Performed by: FAMILY MEDICINE

## 2020-12-18 NOTE — PROGRESS NOTES
Sho Omalley is a [de-identified] y.o. female who presents today for her medical conditions/complaints as noted below. Chief Complaint   Patient presents with    Other     Monthly follow up for chronic medical conditions           HPI:     CJ Stout is an [de-identified] y.o. Hospice patient diagnosed with Alzheimers disease. She also has a history of HTN, bullous pemphigoid, MDD, GERD, osteoarthritis, and insomniaseen for her chronic medical conditions. She is resting comfortably in her bed this morning. She has no complaints. Per nursing she had an episode of rectal pain, which is currently resolved. She is eating well and maintaining a normal weight. She is stooling and urinating well. She did not soil herself this morning. Most recent blood pressures have been normal. She continues to be bed bound. She is not standing up, or walking around. Her mood has been stable on current regimen. GERD controlled with Pepcid and Carafate. Arthritis pain controlled with Ultram and Tylenol. No other concerns per staff. Past Medical History:   Diagnosis Date    Allergic rhinitis     Alzheimer's [facility] ([facility] [address]     Anxiety disorder     Bullous pemphigoid     Dysphagia     GERD (gastroesophageal reflux disease)     HLD (hyperlipidemia)     HTN (hypertension)     MDD (major depressive disorder)     Meniere's disease     Parkinson's disease ([facility] [address]     Polyosteoarthritis       No past surgical history on file.     Family History   Family history unknown: Yes       Social History     Tobacco Use    Smoking status: Never Smoker    Smokeless tobacco: Never Used   Substance Use Topics    Alcohol use: Never     Frequency: Never      Allergies   Allergen Reactions    Aricept [Donepezil Hcl]     Cardizem [Diltiazem Hcl]     Cardura [Doxazosin Mesylate]     Codeine     Doxycycline     Macrodantin [Nitrofurantoin Macrocrystal]     Sulfa Antibiotics        Health Maintenance   Topic Date Due    Potassium monitoring  1940  Creatinine monitoring  1940    DTaP/Tdap/Td vaccine (1 - Tdap) 01/11/1959    Shingles Vaccine (1 of 2) 01/11/1990    DEXA (modify frequency per FRAX score)  01/11/1995    Pneumococcal 65+ years Vaccine (1 of 1 - PPSV23) 01/11/2005    Flu vaccine (1) 09/01/2020    Hepatitis A vaccine  Aged Out    Hepatitis B vaccine  Aged Out    Hib vaccine  Aged Out    Meningococcal (ACWY) vaccine  Aged Out       Subjective:    Review of Systems   Unable to perform ROS: Dementia       Objective:     Physical Exam  Vitals signs and nursing note reviewed. Constitutional:       General: She is not in acute distress. Appearance: Normal appearance. She is not ill-appearing. HENT:      Head: Normocephalic and atraumatic. Right Ear: External ear normal.      Left Ear: External ear normal.   Eyes:      Extraocular Movements: Extraocular movements intact. Conjunctiva/sclera: Conjunctivae normal.      Pupils: Pupils are equal, round, and reactive to light. Neck:      Musculoskeletal: Normal range of motion and neck supple. No muscular tenderness. Cardiovascular:      Rate and Rhythm: Normal rate and regular rhythm. Pulses: Normal pulses. Heart sounds: No murmur. Pulmonary:      Effort: Pulmonary effort is normal. No respiratory distress. Breath sounds: Normal breath sounds. No wheezing. Abdominal:      General: Abdomen is flat. There is no distension. Palpations: Abdomen is soft. Tenderness: There is no abdominal tenderness. Musculoskeletal: Normal range of motion. Right lower leg: No edema. Left lower leg: No edema. Skin:     General: Skin is warm and dry. Capillary Refill: Capillary refill takes less than 2 seconds. Findings: No rash. Neurological:      Mental Status: She is alert. Mental status is at baseline. She is disoriented and confused.    Psychiatric:         Mood and Affect: Mood normal.         Behavior: Behavior normal. /72   Pulse 78   Temp 97.1 °F (36.2 °C) (Infrared)   Resp 14   Wt 146 lb (66.2 kg)   SpO2 96%   BMI 25.06 kg/m²     Assessment:   Elsy Stephenson was seen today for monthly follow up of chronic medical conditions.     Diagnoses and all orders for this visit:    Late onset Alzheimer's disease with behavioral disturbance (HCC)  - Chronic. Currently on hospice. Continue current regimen of depakote, remeron, buspar, and ativan     Moderate episode of recurrent major depressive disorder ([facility] [address]  - Chronic and stable on current psychotropics. Will continue      Gastroesophageal reflux disease without esophagitis  - Stable on pepcid and carafate. Will continue     Essential hypertension  - Stable without meds. Most recent BP of 97/54. Patient's DBP hovers around the 50s. She is not getting up, walking, getting dizzy, and falling. Continue to monitor. If develops significant hypotension or new symptoms, consider starting midodrine      Chronic osteoarthritis  - Controlled on Ultram and tylenol. Continue     Other insomnia  - Stable on remeron. Continue     Bullous pemphigoid  - Stable. No skin issues at this time. Continue to monitor skin     Resident has Alzheimer's dementia, HTN, PD and it is expected to last 12 or more months. These chronic conditions place resident at a significant risk of death, acute exacerbation, or functional decline. The patient's comprehensive plan was monitored today. I spent 20 minutes reviewing plan.         Patient seen and examined. History partially obtained by chart review and nursing notes. I have reviewed patient's past medical, surgical, social, and family history and have made updates where appropriate.   See facility EMR for updated medication list.        Electronically signed by Steven Lopez MD on 12/18/2020 at 12:09 PM

## 2020-12-30 RX ORDER — TRAMADOL HYDROCHLORIDE 50 MG/1
TABLET ORAL
Qty: 120 TABLET | Refills: 5 | Status: SHIPPED | OUTPATIENT
Start: 2020-12-30 | End: 2021-01-29

## 2021-01-01 ENCOUNTER — OUTSIDE SERVICES (OUTPATIENT)
Dept: FAMILY MEDICINE CLINIC | Age: 81
End: 2021-01-01
Payer: MEDICARE

## 2021-01-01 VITALS
HEART RATE: 84 BPM | TEMPERATURE: 97.7 F | SYSTOLIC BLOOD PRESSURE: 138 MMHG | DIASTOLIC BLOOD PRESSURE: 80 MMHG | WEIGHT: 142.2 LBS | RESPIRATION RATE: 16 BRPM | OXYGEN SATURATION: 92 % | BODY MASS INDEX: 24.41 KG/M2

## 2021-01-01 VITALS
HEART RATE: 72 BPM | RESPIRATION RATE: 16 BRPM | TEMPERATURE: 97.7 F | DIASTOLIC BLOOD PRESSURE: 50 MMHG | WEIGHT: 143.2 LBS | OXYGEN SATURATION: 90 % | SYSTOLIC BLOOD PRESSURE: 101 MMHG | BODY MASS INDEX: 24.58 KG/M2

## 2021-01-01 VITALS
TEMPERATURE: 97.1 F | WEIGHT: 141.4 LBS | DIASTOLIC BLOOD PRESSURE: 78 MMHG | SYSTOLIC BLOOD PRESSURE: 110 MMHG | BODY MASS INDEX: 24.14 KG/M2 | OXYGEN SATURATION: 91 % | HEIGHT: 64 IN | HEART RATE: 88 BPM | RESPIRATION RATE: 17 BRPM

## 2021-01-01 VITALS
RESPIRATION RATE: 18 BRPM | TEMPERATURE: 97.7 F | SYSTOLIC BLOOD PRESSURE: 111 MMHG | WEIGHT: 141.8 LBS | OXYGEN SATURATION: 93 % | HEART RATE: 84 BPM | BODY MASS INDEX: 24.34 KG/M2 | DIASTOLIC BLOOD PRESSURE: 65 MMHG

## 2021-01-01 VITALS
DIASTOLIC BLOOD PRESSURE: 85 MMHG | BODY MASS INDEX: 24.31 KG/M2 | RESPIRATION RATE: 18 BRPM | OXYGEN SATURATION: 94 % | TEMPERATURE: 97 F | HEART RATE: 72 BPM | WEIGHT: 141.6 LBS | SYSTOLIC BLOOD PRESSURE: 158 MMHG

## 2021-01-01 VITALS
OXYGEN SATURATION: 96 % | DIASTOLIC BLOOD PRESSURE: 63 MMHG | WEIGHT: 140.6 LBS | SYSTOLIC BLOOD PRESSURE: 114 MMHG | HEART RATE: 85 BPM | BODY MASS INDEX: 24.13 KG/M2 | RESPIRATION RATE: 18 BRPM | TEMPERATURE: 97.3 F

## 2021-01-01 VITALS
HEART RATE: 76 BPM | OXYGEN SATURATION: 94 % | BODY MASS INDEX: 24.52 KG/M2 | TEMPERATURE: 97.7 F | RESPIRATION RATE: 14 BRPM | DIASTOLIC BLOOD PRESSURE: 72 MMHG | WEIGHT: 143.6 LBS | HEIGHT: 64 IN | SYSTOLIC BLOOD PRESSURE: 121 MMHG

## 2021-01-01 VITALS
HEART RATE: 66 BPM | BODY MASS INDEX: 24 KG/M2 | OXYGEN SATURATION: 97 % | TEMPERATURE: 97.6 F | WEIGHT: 139.8 LBS | DIASTOLIC BLOOD PRESSURE: 84 MMHG | RESPIRATION RATE: 16 BRPM | SYSTOLIC BLOOD PRESSURE: 132 MMHG

## 2021-01-01 VITALS
OXYGEN SATURATION: 96 % | HEART RATE: 78 BPM | BODY MASS INDEX: 24.41 KG/M2 | RESPIRATION RATE: 16 BRPM | WEIGHT: 142.2 LBS | SYSTOLIC BLOOD PRESSURE: 124 MMHG | TEMPERATURE: 97 F | DIASTOLIC BLOOD PRESSURE: 71 MMHG

## 2021-01-01 DIAGNOSIS — I10 ESSENTIAL HYPERTENSION: ICD-10-CM

## 2021-01-01 DIAGNOSIS — G47.09 OTHER INSOMNIA: ICD-10-CM

## 2021-01-01 DIAGNOSIS — M19.90 CHRONIC OSTEOARTHRITIS: ICD-10-CM

## 2021-01-01 DIAGNOSIS — F33.1 MODERATE EPISODE OF RECURRENT MAJOR DEPRESSIVE DISORDER (HCC): ICD-10-CM

## 2021-01-01 DIAGNOSIS — F02.818 LATE ONSET ALZHEIMER'S DISEASE WITH BEHAVIORAL DISTURBANCE (HCC): Primary | ICD-10-CM

## 2021-01-01 DIAGNOSIS — K21.9 GASTROESOPHAGEAL REFLUX DISEASE WITHOUT ESOPHAGITIS: ICD-10-CM

## 2021-01-01 DIAGNOSIS — Z51.5 HOSPICE CARE: ICD-10-CM

## 2021-01-01 DIAGNOSIS — L12.0 BULLOUS PEMPHIGOID: ICD-10-CM

## 2021-01-01 DIAGNOSIS — G20 PARKINSON'S DISEASE (HCC): ICD-10-CM

## 2021-01-01 DIAGNOSIS — Z51.5 HOSPICE CARE: Primary | ICD-10-CM

## 2021-01-01 DIAGNOSIS — G30.1 LATE ONSET ALZHEIMER'S DISEASE WITH BEHAVIORAL DISTURBANCE (HCC): Primary | ICD-10-CM

## 2021-01-01 DIAGNOSIS — F02.818 LATE ONSET ALZHEIMER'S DISEASE WITH BEHAVIORAL DISTURBANCE (HCC): ICD-10-CM

## 2021-01-01 DIAGNOSIS — G30.1 LATE ONSET ALZHEIMER'S DISEASE WITH BEHAVIORAL DISTURBANCE (HCC): ICD-10-CM

## 2021-01-01 DIAGNOSIS — M19.90 CHRONIC OSTEOARTHRITIS: Primary | ICD-10-CM

## 2021-01-01 PROCEDURE — 99309 SBSQ NF CARE MODERATE MDM 30: CPT | Performed by: NURSE PRACTITIONER

## 2021-01-01 PROCEDURE — 99309 SBSQ NF CARE MODERATE MDM 30: CPT | Performed by: FAMILY MEDICINE

## 2021-01-01 PROCEDURE — 99490 CHRNC CARE MGMT STAFF 1ST 20: CPT | Performed by: FAMILY MEDICINE

## 2021-01-01 PROCEDURE — 99490 CHRNC CARE MGMT STAFF 1ST 20: CPT | Performed by: NURSE PRACTITIONER

## 2021-01-01 RX ORDER — TRAMADOL HYDROCHLORIDE 50 MG/1
TABLET ORAL
Qty: 120 TABLET | Refills: 5 | Status: SHIPPED | OUTPATIENT
Start: 2021-01-01 | End: 2021-01-01

## 2021-01-01 RX ORDER — LOPERAMIDE HYDROCHLORIDE 2 MG/1
CAPSULE ORAL
COMMUNITY
Start: 2021-01-01

## 2021-01-01 RX ORDER — LORAZEPAM 0.5 MG/1
TABLET ORAL
Qty: 60 TABLET | Refills: 5 | Status: SHIPPED | OUTPATIENT
Start: 2021-01-01 | End: 2021-01-01

## 2021-01-01 RX ORDER — TRAMADOL HYDROCHLORIDE 50 MG/1
TABLET ORAL
COMMUNITY
Start: 2021-01-01 | End: 2022-01-01 | Stop reason: SDUPTHER

## 2021-01-01 RX ORDER — TRAMADOL HYDROCHLORIDE 50 MG/1
TABLET ORAL
Qty: 56 TABLET | Refills: 0 | OUTPATIENT
Start: 2021-01-01 | End: 2021-01-01

## 2021-01-02 DIAGNOSIS — Z51.5 HOSPICE CARE: Primary | ICD-10-CM

## 2021-01-03 RX ORDER — LORAZEPAM 0.5 MG/1
TABLET ORAL
Qty: 60 TABLET | Refills: 2 | Status: SHIPPED | OUTPATIENT
Start: 2021-01-03 | End: 2021-02-01

## 2021-01-22 ENCOUNTER — OUTSIDE SERVICES (OUTPATIENT)
Dept: FAMILY MEDICINE CLINIC | Age: 81
End: 2021-01-22
Payer: MEDICARE

## 2021-01-22 VITALS
RESPIRATION RATE: 16 BRPM | DIASTOLIC BLOOD PRESSURE: 57 MMHG | HEART RATE: 68 BPM | BODY MASS INDEX: 24.41 KG/M2 | SYSTOLIC BLOOD PRESSURE: 104 MMHG | OXYGEN SATURATION: 96 % | WEIGHT: 142.2 LBS | TEMPERATURE: 97.9 F

## 2021-01-22 DIAGNOSIS — I10 ESSENTIAL HYPERTENSION: ICD-10-CM

## 2021-01-22 DIAGNOSIS — M19.90 CHRONIC OSTEOARTHRITIS: ICD-10-CM

## 2021-01-22 DIAGNOSIS — G30.1 LATE ONSET ALZHEIMER'S DISEASE WITH BEHAVIORAL DISTURBANCE (HCC): ICD-10-CM

## 2021-01-22 DIAGNOSIS — Z51.5 HOSPICE CARE: Primary | ICD-10-CM

## 2021-01-22 DIAGNOSIS — G20 PARKINSON'S DISEASE (HCC): ICD-10-CM

## 2021-01-22 DIAGNOSIS — L12.0 BULLOUS PEMPHIGOID: ICD-10-CM

## 2021-01-22 DIAGNOSIS — F33.1 MODERATE EPISODE OF RECURRENT MAJOR DEPRESSIVE DISORDER (HCC): ICD-10-CM

## 2021-01-22 DIAGNOSIS — K21.9 GASTROESOPHAGEAL REFLUX DISEASE WITHOUT ESOPHAGITIS: ICD-10-CM

## 2021-01-22 DIAGNOSIS — F02.818 LATE ONSET ALZHEIMER'S DISEASE WITH BEHAVIORAL DISTURBANCE (HCC): ICD-10-CM

## 2021-01-22 PROCEDURE — 99309 SBSQ NF CARE MODERATE MDM 30: CPT | Performed by: FAMILY MEDICINE

## 2021-01-22 PROCEDURE — 99490 CHRNC CARE MGMT STAFF 1ST 20: CPT | Performed by: FAMILY MEDICINE

## 2021-01-22 NOTE — PROGRESS NOTES
Stacy Carrion is a 80 y.o. female who presents today for her medical conditions/complaints as noted below. Chief Complaint   Patient presents with    Other     Monthly follow up for chronic medical conditions           HPI:     HPI  Lauren Duque is an [de-identified] y.o. Hospice patient diagnosed with Alzheimers disease. She also has a history of HTN, bullous pemphigoid, MDD, GERD, osteoarthritis, and insomnia seen today for her chronic medical conditions. She is resting comfortably in her chair. She has no acute complaints. Per nursing she had increased pain, and was started on tramadol, which seems to be helping. She is eating well and maintaining a normal weight. She is stooling and urinating well. No diarrhea per nursing staff. Most recent blood pressures have been marginal. She continues to be bed bound. She is not standing up, or walking around. Her mood has been stable on current regimen. GERD controlled with Pepcid and Carafate. Arthritis pain controlled with Ultram, tramadol, and Tylenol. No other concerns per staff. Past Medical History:   Diagnosis Date    Allergic rhinitis     Alzheimer's disease ([facility] [address]     Anxiety disorder     Bullous pemphigoid     Dysphagia     GERD (gastroesophageal reflux disease)     HLD (hyperlipidemia)     HTN (hypertension)     MDD (major depressive disorder)     Meniere's disease     Parkinson's disease ([facility] [address]     Polyosteoarthritis       No past surgical history on file.     Family History   Family history unknown: Yes       Social History     Tobacco Use    Smoking status: Never Smoker    Smokeless tobacco: Never Used   Substance Use Topics    Alcohol use: Never     Frequency: Never      Allergies   Allergen Reactions    Aricept [Donepezil Hcl]     Cardizem [Diltiazem Hcl]     Cardura [Doxazosin Mesylate]     Codeine     Doxycycline     Macrodantin [Nitrofurantoin Macrocrystal]     Sulfa Antibiotics        Health Maintenance   Topic Date Due  Potassium monitoring  1940    Creatinine monitoring  1940    COVID-19 Vaccine (1 of 2) 01/11/1956    DTaP/Tdap/Td vaccine (1 - Tdap) 01/11/1959    Shingles Vaccine (1 of 2) 01/11/1990    DEXA (modify frequency per FRAX score)  01/11/1995    Pneumococcal 65+ years Vaccine (1 of 1 - PPSV23) 01/11/2005    Flu vaccine (1) 09/01/2020    Hepatitis A vaccine  Aged Out    Hepatitis B vaccine  Aged Out    Hib vaccine  Aged Out    Meningococcal (ACWY) vaccine  Aged Out       Subjective:    Review of Systems   Unable to perform ROS: Dementia       Objective:     Physical Exam  Vitals signs and nursing note reviewed. Constitutional:       General: She is not in acute distress. Appearance: Normal appearance. She is not ill-appearing. HENT:      Head: Normocephalic and atraumatic. Right Ear: External ear normal.      Left Ear: External ear normal.   Eyes:      Extraocular Movements: Extraocular movements intact. Conjunctiva/sclera: Conjunctivae normal.      Pupils: Pupils are equal, round, and reactive to light. Neck:      Musculoskeletal: Normal range of motion and neck supple. No muscular tenderness. Cardiovascular:      Rate and Rhythm: Normal rate and regular rhythm. Pulses: Normal pulses. Heart sounds: No murmur. Pulmonary:      Effort: Pulmonary effort is normal. No respiratory distress. Breath sounds: Normal breath sounds. No wheezing. Abdominal:      General: Abdomen is flat. There is no distension. Palpations: Abdomen is soft. Tenderness: There is no abdominal tenderness. Musculoskeletal: Normal range of motion. Right lower leg: No edema. Left lower leg: No edema. Skin:     General: Skin is warm and dry. Capillary Refill: Capillary refill takes less than 2 seconds. Findings: No rash. Neurological:      Mental Status: She is alert. Mental status is at baseline. She is disoriented and confused.    Psychiatric:

## 2021-02-01 DIAGNOSIS — F41.9 ANXIETY HYPERVENTILATION: Primary | ICD-10-CM

## 2021-02-01 DIAGNOSIS — F45.8 ANXIETY HYPERVENTILATION: Primary | ICD-10-CM

## 2021-02-01 DIAGNOSIS — Z51.5 HOSPICE CARE: ICD-10-CM

## 2021-02-01 RX ORDER — LORAZEPAM 0.5 MG/1
0.5 TABLET ORAL 2 TIMES DAILY
Qty: 120 TABLET | Refills: 2 | Status: SHIPPED | OUTPATIENT
Start: 2021-02-01 | End: 2021-01-01

## 2021-02-19 ENCOUNTER — OUTSIDE SERVICES (OUTPATIENT)
Dept: FAMILY MEDICINE CLINIC | Age: 81
End: 2021-02-19
Payer: MEDICARE

## 2021-02-19 VITALS
TEMPERATURE: 97.5 F | OXYGEN SATURATION: 94 % | HEART RATE: 66 BPM | WEIGHT: 142.2 LBS | DIASTOLIC BLOOD PRESSURE: 60 MMHG | BODY MASS INDEX: 24.41 KG/M2 | SYSTOLIC BLOOD PRESSURE: 103 MMHG | RESPIRATION RATE: 16 BRPM

## 2021-02-19 DIAGNOSIS — G47.09 OTHER INSOMNIA: ICD-10-CM

## 2021-02-19 DIAGNOSIS — L12.0 BULLOUS PEMPHIGOID: ICD-10-CM

## 2021-02-19 DIAGNOSIS — G20 PARKINSON'S DISEASE (HCC): ICD-10-CM

## 2021-02-19 DIAGNOSIS — F33.1 MODERATE EPISODE OF RECURRENT MAJOR DEPRESSIVE DISORDER (HCC): ICD-10-CM

## 2021-02-19 DIAGNOSIS — I10 ESSENTIAL HYPERTENSION: ICD-10-CM

## 2021-02-19 DIAGNOSIS — M19.90 CHRONIC OSTEOARTHRITIS: ICD-10-CM

## 2021-02-19 DIAGNOSIS — K21.9 GASTROESOPHAGEAL REFLUX DISEASE WITHOUT ESOPHAGITIS: ICD-10-CM

## 2021-02-19 DIAGNOSIS — G30.1 LATE ONSET ALZHEIMER'S DISEASE WITH BEHAVIORAL DISTURBANCE (HCC): Primary | ICD-10-CM

## 2021-02-19 DIAGNOSIS — F02.818 LATE ONSET ALZHEIMER'S DISEASE WITH BEHAVIORAL DISTURBANCE (HCC): Primary | ICD-10-CM

## 2021-02-19 PROCEDURE — 99309 SBSQ NF CARE MODERATE MDM 30: CPT | Performed by: FAMILY MEDICINE

## 2021-02-19 PROCEDURE — 99490 CHRNC CARE MGMT STAFF 1ST 20: CPT | Performed by: FAMILY MEDICINE

## 2021-02-19 RX ORDER — BUSPIRONE HYDROCHLORIDE 10 MG/1
TABLET ORAL
COMMUNITY
Start: 2021-01-01

## 2021-02-19 RX ORDER — MENTHOL 40 MG/ML
GEL TOPICAL
COMMUNITY
Start: 2020-12-07

## 2021-02-19 RX ORDER — MIRTAZAPINE 7.5 MG/1
TABLET, FILM COATED ORAL
COMMUNITY
Start: 2021-01-01

## 2021-02-19 RX ORDER — ACETAMINOPHEN 500 MG/1
TABLET ORAL
COMMUNITY
Start: 2021-01-01

## 2021-02-19 NOTE — PROGRESS NOTES
Dell Jhaveri is a 80 y.o. female who presents today for her medical conditions/complaints as noted below. Chief Complaint   Patient presents with    Other     Monthly follow up for chronic medical conditions           HPI:     Mouna Madrigal is an 80 y.o. Hospice patient diagnosed with Alzheimers disease. She also has a history of HTN, bullous pemphigoid, MDD, GERD, osteoarthritis, and insomnia seen today for her chronic medical conditions. She is resting comfortably in her chair. She has no acute complaints. She is eating well and maintaining a normal weight. She is stooling and urinating well. No diarrhea per nursing staff. Blood pressures continues marginal. She continues to be bed bound. She is not standing up, or walking around. Her mood has been stable on current regimen. GERD controlled with Pepcid and Carafate. Arthritis pain controlled with Ultram, tramadol, and Tylenol. No other concerns per staff. Past Medical History:   Diagnosis Date    Allergic rhinitis     Alzheimer's disease ([facility] [address]     Anxiety disorder     Bullous pemphigoid     Dysphagia     GERD (gastroesophageal reflux disease)     HLD (hyperlipidemia)     HTN (hypertension)     MDD (major depressive disorder)     Meniere's disease     Parkinson's disease ([facility] [address]     Polyosteoarthritis       No past surgical history on file.     Family History   Family history unknown: Yes       Social History     Tobacco Use    Smoking status: Never Smoker    Smokeless tobacco: Never Used   Substance Use Topics    Alcohol use: Never     Frequency: Never      Allergies   Allergen Reactions    Aricept [Donepezil Hcl]     Cardizem [Diltiazem Hcl]     Cardura [Doxazosin Mesylate]     Codeine     Doxycycline     Macrodantin [Nitrofurantoin Macrocrystal]     Sulfa Antibiotics        Health Maintenance   Topic Date Due    Potassium monitoring  1940    Creatinine monitoring  1940    COVID-19 Vaccine (1 of 2) 01/11/1956    DTaP/Tdap/Td vaccine (1 - Tdap) 01/11/1959    Shingles Vaccine (1 of 2) 01/11/1990    DEXA (modify frequency per FRAX score)  01/11/1995    Pneumococcal 65+ years Vaccine (1 of 1 - PPSV23) 01/11/2005    Flu vaccine (1) 09/01/2020    Hepatitis A vaccine  Aged Out    Hepatitis B vaccine  Aged Out    Hib vaccine  Aged Out    Meningococcal (ACWY) vaccine  Aged Out       Subjective:    Review of Systems   Unable to perform ROS: Dementia       Objective:     Physical Exam  Vitals signs and nursing note reviewed. Constitutional:       General: She is not in acute distress. Appearance: Normal appearance. She is not ill-appearing. HENT:      Head: Normocephalic and atraumatic. Right Ear: External ear normal.      Left Ear: External ear normal.   Eyes:      Extraocular Movements: Extraocular movements intact. Conjunctiva/sclera: Conjunctivae normal.      Pupils: Pupils are equal, round, and reactive to light. Neck:      Musculoskeletal: Normal range of motion and neck supple. No muscular tenderness. Cardiovascular:      Rate and Rhythm: Normal rate and regular rhythm. Pulses: Normal pulses. Heart sounds: No murmur. Pulmonary:      Effort: Pulmonary effort is normal. No respiratory distress. Breath sounds: Normal breath sounds. No wheezing. Abdominal:      General: Abdomen is flat. There is no distension. Palpations: Abdomen is soft. Tenderness: There is no abdominal tenderness. Musculoskeletal: Normal range of motion. Right lower leg: No edema. Left lower leg: No edema. Skin:     General: Skin is warm and dry. Capillary Refill: Capillary refill takes less than 2 seconds. Findings: No rash. Neurological:      Mental Status: She is alert. Mental status is at baseline. She is disoriented and confused. Psychiatric:      Comments: Does not make eye contact. Mumbles to herself.         /60   Pulse 66   Temp 97.5 °F (36.4 °C)   Resp 16 Wt 142 lb 3.2 oz (64.5 kg)   SpO2 94%   BMI 24.41 kg/m²     Assessment:   Emily Roe was seen today for monthly follow up of chronic medical conditions.     Diagnoses and all orders for this visit:    Late onset Alzheimer's disease with behavioral disturbance (HCC)  - Chronic. Currently on hospice. Continue current regimen of depakote, remeron, buspar, and ativan     Moderate episode of recurrent major depressive disorder ([facility] [address]  - Chronic and stable on current psychotropics. Will continue      Gastroesophageal reflux disease without esophagitis  - Stable on pepcid and carafate. Will continue     Essential hypertension  - Stable without meds. Most recent BP of 103/60. Patient's DBP hovers around the 50s. She is not getting up, walking, getting dizzy, and falling. Continue to monitor. If develops significant hypotension or new symptoms, consider starting midodrine      Chronic osteoarthritis  - Controlled on Tramadol, Ultram and tylenol. Continue     Other insomnia  - Stable on remeron. Continue     Bullous pemphigoid  - Stable. No skin issues at this time. Continue to monitor skin     Resident has Alzheimer's dementia, HTN, PD and it is expected to last 12 or more months. These chronic conditions place resident at a significant risk of death, acute exacerbation, or functional decline. The patient's comprehensive plan was monitored today. I spent 20 minutes reviewing plan.         Patient seen and examined. History partially obtained by chart review and nursing notes. I have reviewed patient's past medical, surgical, social, and family history and have made updates where appropriate.   See facility EMR for updated medication list.        Electronically signed by Viet Loera MD on 2/19/2021 at 11:37 AM

## 2021-03-19 ENCOUNTER — OUTSIDE SERVICES (OUTPATIENT)
Dept: FAMILY MEDICINE CLINIC | Age: 81
End: 2021-03-19
Payer: MEDICARE

## 2021-03-19 VITALS
HEART RATE: 83 BPM | SYSTOLIC BLOOD PRESSURE: 116 MMHG | RESPIRATION RATE: 16 BRPM | WEIGHT: 141.6 LBS | BODY MASS INDEX: 24.31 KG/M2 | TEMPERATURE: 97.4 F | DIASTOLIC BLOOD PRESSURE: 65 MMHG | OXYGEN SATURATION: 94 %

## 2021-03-19 DIAGNOSIS — K21.9 GASTROESOPHAGEAL REFLUX DISEASE WITHOUT ESOPHAGITIS: ICD-10-CM

## 2021-03-19 DIAGNOSIS — L12.0 BULLOUS PEMPHIGOID: ICD-10-CM

## 2021-03-19 DIAGNOSIS — F02.818 LATE ONSET ALZHEIMER'S DISEASE WITH BEHAVIORAL DISTURBANCE (HCC): Primary | ICD-10-CM

## 2021-03-19 DIAGNOSIS — M19.90 CHRONIC OSTEOARTHRITIS: ICD-10-CM

## 2021-03-19 DIAGNOSIS — G30.1 LATE ONSET ALZHEIMER'S DISEASE WITH BEHAVIORAL DISTURBANCE (HCC): Primary | ICD-10-CM

## 2021-03-19 DIAGNOSIS — F33.1 MODERATE EPISODE OF RECURRENT MAJOR DEPRESSIVE DISORDER (HCC): ICD-10-CM

## 2021-03-19 DIAGNOSIS — I10 ESSENTIAL HYPERTENSION: ICD-10-CM

## 2021-03-19 PROCEDURE — 99490 CHRNC CARE MGMT STAFF 1ST 20: CPT | Performed by: FAMILY MEDICINE

## 2021-03-19 PROCEDURE — 99309 SBSQ NF CARE MODERATE MDM 30: CPT | Performed by: FAMILY MEDICINE

## 2021-03-19 NOTE — PROGRESS NOTES
Piero Farias is a 80 y.o. female who presents today for her medical conditions/complaints as noted below. Chief Complaint   Patient presents with    Other     monthly follow up of chronic medical conditions           HPI:     Elodia Matos is an 80 y.o. Hospice patient diagnosed with Alzheimers disease. She also has a history of HTN, bullous pemphigoid, MDD, GERD, osteoarthritis, and insomnia seen today for her chronic medical conditions. She is resting comfortably in her bed. She has no acute complaints. She has been eating well and maintaining a normal weight. She is stooling and urinating well. No diarrhea per nursing staff. Blood pressures within normal limits. She continues to be bed bound. She is not standing up, or walking around. Her mood has been stable on current regimen. GERD controlled with Pepcid and Carafate. Arthritis pain controlled with Ultram, tramadol, and Tylenol. No other concerns per staff. Past Medical History:   Diagnosis Date    Allergic rhinitis     Alzheimer's disease ([facility] [address]     Anxiety disorder     Bullous pemphigoid     Dysphagia     GERD (gastroesophageal reflux disease)     HLD (hyperlipidemia)     HTN (hypertension)     MDD (major depressive disorder)     Meniere's disease     Parkinson's disease ([facility] [address]     Polyosteoarthritis       No past surgical history on file.     Family History   Family history unknown: Yes       Social History     Tobacco Use    Smoking status: Never Smoker    Smokeless tobacco: Never Used   Substance Use Topics    Alcohol use: Never     Frequency: Never      Allergies   Allergen Reactions    Aricept [Donepezil Hcl]     Cardizem [Diltiazem Hcl]     Cardura [Doxazosin Mesylate]     Codeine     Doxycycline     Macrodantin [Nitrofurantoin Macrocrystal]     Sulfa Antibiotics        Health Maintenance   Topic Date Due    Potassium monitoring  Never done    Creatinine monitoring  Never done    COVID-19 Vaccine (1) Never done    DTaP/Tdap/Td vaccine (1 - Tdap) Never done    Shingles Vaccine (1 of 2) Never done    DEXA (modify frequency per FRAX score)  Never done    Pneumococcal 65+ years Vaccine (1 of 1 - PPSV23) Never done    Flu vaccine (1) Never done    Hepatitis A vaccine  Aged Out    Hepatitis B vaccine  Aged Out    Hib vaccine  Aged Out    Meningococcal (ACWY) vaccine  Aged Out       Subjective:    Review of Systems   Unable to perform ROS: Dementia       Objective:     Physical Exam  Vitals signs and nursing note reviewed. Constitutional:       General: She is not in acute distress. Appearance: Normal appearance. She is not ill-appearing. HENT:      Head: Normocephalic and atraumatic. Right Ear: External ear normal.      Left Ear: External ear normal.   Eyes:      Extraocular Movements: Extraocular movements intact. Conjunctiva/sclera: Conjunctivae normal.      Pupils: Pupils are equal, round, and reactive to light. Neck:      Musculoskeletal: Normal range of motion and neck supple. No muscular tenderness. Cardiovascular:      Rate and Rhythm: Normal rate and regular rhythm. Pulses: Normal pulses. Heart sounds: No murmur. Pulmonary:      Effort: Pulmonary effort is normal. No respiratory distress. Breath sounds: Normal breath sounds. No wheezing. Abdominal:      General: Abdomen is flat. There is no distension. Palpations: Abdomen is soft. Tenderness: There is no abdominal tenderness. Musculoskeletal: Normal range of motion. Right lower leg: No edema. Left lower leg: No edema. Skin:     General: Skin is warm and dry. Capillary Refill: Capillary refill takes less than 2 seconds. Findings: No rash. Neurological:      Mental Status: She is alert. Mental status is at baseline. She is disoriented and confused. Psychiatric:      Comments: Does not make eye contact. Mumbles to herself.         /65   Pulse 83   Temp 97.4 °F (36.3 °C)   Resp 16   Wt 141 lb 9.6

## 2021-04-23 NOTE — PROGRESS NOTES
Saw Muñoz is a 80 y.o. female who presents today for her medical conditions/complaints as noted below. Chief Complaint   Patient presents with    Other     Monthly visit for chronic medical conditions            HPI:     Pierre Fields is an 80 y.o. Hospice patient diagnosed with Alzheimers disease. She also has a history of HTN, bullous pemphigoid, MDD, GERD, osteoarthritis, and insomnia seen today for her chronic medical conditions. She is resting comfortably in her bed. She has no acute complaints. She has been eating well and maintaining a normal weight. She is stooling and urinating well. No diarrhea per nursing staff. Blood pressures within normal limits. She continues to be bed bound. She is not standing up, or walking around. Her mood has been stable on current regimen. GERD controlled with Pepcid and Carafate. Arthritis pain controlled with Ultram, tramadol, and Tylenol. No other concerns per staff. Past Medical History:   Diagnosis Date    Allergic rhinitis     Alzheimer's disease ([facility] [address]     Anxiety disorder     Bullous pemphigoid     Dysphagia     GERD (gastroesophageal reflux disease)     HLD (hyperlipidemia)     HTN (hypertension)     MDD (major depressive disorder)     Meniere's disease     Parkinson's disease ([facility] [address]     Polyosteoarthritis       No past surgical history on file.     Family History   Family history unknown: Yes       Social History     Tobacco Use    Smoking status: Never Smoker    Smokeless tobacco: Never Used   Substance Use Topics    Alcohol use: Never     Frequency: Never      Allergies   Allergen Reactions    Aricept [Donepezil Hcl]     Cardizem [Diltiazem Hcl]     Cardura [Doxazosin Mesylate]     Codeine     Doxycycline     Macrodantin [Nitrofurantoin Macrocrystal]     Sulfa Antibiotics        Health Maintenance   Topic Date Due    Potassium monitoring  Never done    Creatinine monitoring  Never done    COVID-19 Vaccine (1) Never done    DTaP/Tdap/Td vaccine (1 - Tdap) Never done    Shingles Vaccine (1 of 2) Never done    DEXA (modify frequency per FRAX score)  Never done    Pneumococcal 65+ years Vaccine (1 of 1 - PPSV23) Never done    Flu vaccine (Season Ended) 09/01/2021    Hepatitis A vaccine  Aged Out    Hepatitis B vaccine  Aged Out    Hib vaccine  Aged Out    Meningococcal (ACWY) vaccine  Aged Out       Subjective:    Review of Systems   Unable to perform ROS: Dementia       Objective:     Physical Exam  Vitals signs and nursing note reviewed. Constitutional:       General: She is not in acute distress. Appearance: Normal appearance. She is not ill-appearing. HENT:      Head: Normocephalic and atraumatic. Right Ear: External ear normal.      Left Ear: External ear normal.   Eyes:      Extraocular Movements: Extraocular movements intact. Conjunctiva/sclera: Conjunctivae normal.      Pupils: Pupils are equal, round, and reactive to light. Neck:      Musculoskeletal: Normal range of motion and neck supple. No muscular tenderness. Cardiovascular:      Rate and Rhythm: Normal rate and regular rhythm. Pulses: Normal pulses. Heart sounds: No murmur. Pulmonary:      Effort: Pulmonary effort is normal. No respiratory distress. Breath sounds: Normal breath sounds. No wheezing. Abdominal:      General: Abdomen is flat. There is no distension. Palpations: Abdomen is soft. Tenderness: There is no abdominal tenderness. Musculoskeletal: Normal range of motion. Right lower leg: No edema. Left lower leg: No edema. Skin:     General: Skin is warm and dry. Capillary Refill: Capillary refill takes less than 2 seconds. Findings: No rash. Neurological:      Mental Status: She is alert. Mental status is at baseline. She is disoriented and confused. Psychiatric:      Comments: Does not make eye contact. Mumbles to herself.         /84   Pulse 66   Temp 97.6 °F (36.4 °C)   Resp 16   Wt 139 lb 12.8 oz (63.4 kg)   SpO2 97%   BMI 24.00 kg/m²     Assessment:   Mary Maya was seen today for monthly follow up of chronic medical conditions.     Diagnoses and all orders for this visit:    Late onset Alzheimer's disease with behavioral disturbance (HCC)  - Chronic. Currently on hospice. Continue current regimen of depakote, remeron, buspar, and ativan     Moderate episode of recurrent major depressive disorder ([facility] [address]  - Chronic and stable on current psychotropics. Will continue      Gastroesophageal reflux disease without esophagitis  - Stable on pepcid and carafate. Will continue     Essential hypertension  - Stable without meds. Most recent BP of 116/65. Patient's DBP hovers around the 50s. She is not getting up, walking, getting dizzy, and falling. Continue to monitor. If develops significant hypotension or new symptoms, consider starting midodrine      Chronic osteoarthritis  - Controlled on Tramadol, Ultram and tylenol. Continue     Other insomnia  - Stable on remeron. Continue     Bullous pemphigoid  - Stable. No skin issues at this time. Continue to monitor skin     Resident has Alzheimer's dementia, HTN, PD and it is expected to last 12 or more months. These chronic conditions place resident at a significant risk of death, acute exacerbation, or functional decline. The patient's comprehensive plan was monitored today. I spent 20 minutes reviewing plan.         Patient seen and examined. History partially obtained by chart review and nursing notes. I have reviewed patient's past medical, surgical, social, and family history and have made updates where appropriate.   See facility EMR for updated medication list.        Electronically signed by Lavern Farmer MD on 4/23/2021 at 11:48 AM

## 2021-05-21 NOTE — PROGRESS NOTES
Kamla Sarmiento is a 80 y.o. female who presents today for her medical conditions/complaints as noted below. Chief Complaint   Patient presents with    1 Month Follow-Up     Follow up on chronic health conditions           HPI:     Patient seen and examined in room today as she is lying in bed. She has significant dementia secondary to Alzheimer's disease. Patient also has a history of Parkinson's disease. She is on hospice. Past medical history of hypertension,'s bullous pemphigoid, depression, GERD, insomnia, osteoarthritis, depression, anxiety. She has had no recent falls and weight is stable 142 pounds. Blood pressures are stable. No documented signs of increased behaviors. Patient remains on Ativan, Depakote, Zoloft, and BuSpar. All of these remain beneficial for her overall regimen. Past Medical History:   Diagnosis Date    Allergic rhinitis     Alzheimer's disease ([facility] [address]     Anxiety disorder     Bullous pemphigoid     Dysphagia     GERD (gastroesophageal reflux disease)     HLD (hyperlipidemia)     HTN (hypertension)     MDD (major depressive disorder)     Meniere's disease     Parkinson's disease ([facility] [address]     Polyosteoarthritis       History reviewed. No pertinent surgical history.     Family History   Family history unknown: Yes       Social History     Tobacco Use    Smoking status: Never Smoker    Smokeless tobacco: Never Used   Substance Use Topics    Alcohol use: Never      Allergies   Allergen Reactions    Aricept [Donepezil Hcl]     Cardizem [Diltiazem Hcl]     Cardura [Doxazosin Mesylate]     Codeine     Doxycycline     Macrodantin [Nitrofurantoin Macrocrystal]     Sulfa Antibiotics        Health Maintenance   Topic Date Due    Potassium monitoring  Never done    Creatinine monitoring  Never done    COVID-19 Vaccine (1) Never done    DTaP/Tdap/Td vaccine (1 - Tdap) Never done    Shingles Vaccine (1 of 2) Never done    DEXA (modify frequency per FRAX score)  Never done    Pneumococcal 65+ years Vaccine (1 of 1 - PPSV23) Never done    Flu vaccine (Season Ended) 09/01/2021    Hepatitis A vaccine  Aged Out    Hepatitis B vaccine  Aged Out    Hib vaccine  Aged Out    Meningococcal (ACWY) vaccine  Aged Out       Subjective:      Review of Systems   Unable to perform ROS: Dementia       Objective:     Physical Exam  Vitals and nursing note reviewed. Exam conducted with a chaperone present. Constitutional:       General: She is not in acute distress. Appearance: Normal appearance. She is ill-appearing (chronically). HENT:      Head: Normocephalic and atraumatic. Right Ear: Hearing normal.      Left Ear: Hearing normal.      Nose: Nose normal.   Eyes:      General: Lids are normal.   Cardiovascular:      Rate and Rhythm: Normal rate and regular rhythm. Heart sounds: Normal heart sounds, S1 normal and S2 normal.   Pulmonary:      Effort: Pulmonary effort is normal. No tachypnea or bradypnea. Breath sounds: Normal breath sounds. Abdominal:      General: Abdomen is flat. Bowel sounds are normal.      Palpations: Abdomen is soft. Tenderness: There is no abdominal tenderness. Musculoskeletal:      Cervical back: Normal range of motion and neck supple. Right lower leg: No edema. Left lower leg: No edema. Skin:     General: Skin is warm and dry. Neurological:      Mental Status: She is alert. Mental status is at baseline. She is disoriented. Motor: Weakness present. Psychiatric:         Mood and Affect: Affect is labile. Speech: Speech is tangential.         Cognition and Memory: Memory is impaired. She exhibits impaired recent memory and impaired remote memory. /71   Pulse 78   Temp 97 °F (36.1 °C)   Resp 16   Wt 142 lb 3.2 oz (64.5 kg)   SpO2 96%   BMI 24.41 kg/m²     Assessment/Plan      1. Late onset Alzheimer's disease with behavioral disturbance ([facility] [address] -weight remains stable. No recent falls. Continue Remeron, Depakote, BuSpar, Ativan, Zoloft. All remain beneficial to her overall care. Monitor for falls and add interventions. 2. Moderate episode of recurrent major depressive disorder (HCC)-chronic and continue Ativan, BuSpar, Depakote, and Zoloft. 3. Gastroesophageal reflux disease without esophagitis -chronic and continue current dose of Pepcid along with Carafate. Continue to monitor. 4. Essential hypertension -blood pressures remained stable without medications. Continue to monitor monthly and as needed. 5. Chronic osteoarthritis -chronic and continue Ultram and Tylenol. 6. Other insomnia -chronic and continue Remeron   7. Bullous pemphigoid  -skin remains intact and no rash or wounds noted. 8. Hospice care -continue supportive care. Resident has AD, HTN, PD, MDD, OA and it is expected to last 12 or more months. These chronic conditions place resident at a significant risk of death, acute exacerbation, or functional decline. The patient's comprehensive plan was monitored today. I spent 20 minutes reviewing plan. Patient seen and examined. History partially obtained by chart review and nursing notes. I have reviewed patient's past medical, surgical, social, and family history and have made updates where appropriate.   See facility EMR for updated medication list.       Electronically signed by Olga Kapoor MD on 5/21/2021 at 1:30 PM

## 2021-07-23 NOTE — PROGRESS NOTES
Shanique Mejias is a 80 y.o. female who presents today for her medical conditions/complaints as noted below. Chief Complaint   Patient presents with    Other     Monthly follow up of chronic medical conditions            HPI:     Emilio Ang was seen and evaluated today during nursing home rounds. She is seen today for her chronic medical conditions including f Alzheimer's disease, hypertension, bullous pemphigoid, depression, GERD, insomnia, osteoarthritis, depression, Parkinson's disease. She is currently hospice. She has not had any recent falls or injuries. Per nursing, the patient has been a little more agitated and have required more ativan. The patient has continued to gain weight. She has no complaints at this time. Has not had any vomiting, chest pain, difficulty breathing. She is stooling well and urinating well. Her BP remain borderline low, but the patient remains bed bound without risk of falls. Past Medical History:   Diagnosis Date    Allergic rhinitis     [facility] ([facility] [address]     Anxiety disorder     Bullous pemphigoid     Dysphagia     GERD (gastroesophageal reflux disease)     HLD (hyperlipidemia)     HTN (hypertension)     MDD (major depressive disorder)     Meniere's disease     Parkinson's disease ([facility] [address]     Polyosteoarthritis       No past surgical history on file.     Family History   Family history unknown: Yes       Social History     Tobacco Use    Smoking status: Never Smoker    Smokeless tobacco: Never Used   Substance Use Topics    Alcohol use: Never      Allergies   Allergen Reactions    Aricept [Donepezil Hcl]     Cardizem [Diltiazem Hcl]     Cardura [Doxazosin Mesylate]     Codeine     Doxycycline     Macrodantin [Nitrofurantoin Macrocrystal]     Sulfa Antibiotics        Health Maintenance   Topic Date Due    Potassium monitoring  Never done    Creatinine monitoring  Never done    DTaP/Tdap/Td vaccine (1 - Tdap) Never done    Shingles Vaccine (1 of 2) Never done    DEXA (modify frequency per FRAX score)  Never done    Flu vaccine (1) 09/01/2021    Pneumococcal 65+ years Vaccine  Completed    COVID-19 Vaccine  Completed    Hepatitis A vaccine  Aged Out    Hepatitis B vaccine  Aged Out    Hib vaccine  Aged Out    Meningococcal (ACWY) vaccine  Aged Out       Subjective:      Review of Systems   Unable to perform ROS: Dementia       Objective:     Physical Exam  Vitals and nursing note reviewed. Exam conducted with a chaperone present. Constitutional:       General: She is not in acute distress. Appearance: Normal appearance. She is not ill-appearing. HENT:      Head: Normocephalic and atraumatic. Right Ear: Hearing normal.      Left Ear: Hearing normal.      Nose: Nose normal.   Eyes:      General: Lids are normal.   Cardiovascular:      Rate and Rhythm: Normal rate and regular rhythm. Heart sounds: Normal heart sounds, S1 normal and S2 normal.   Pulmonary:      Effort: Pulmonary effort is normal. No tachypnea or bradypnea. Breath sounds: Normal breath sounds. Abdominal:      General: Abdomen is flat. Bowel sounds are normal.      Palpations: Abdomen is soft. Tenderness: There is no abdominal tenderness. Musculoskeletal:      Cervical back: Normal range of motion and neck supple. Right lower leg: No edema. Left lower leg: No edema. Skin:     General: Skin is warm and dry. Neurological:      Mental Status: She is alert. Mental status is at baseline. She is disoriented. Psychiatric:         Mood and Affect: Affect is labile. Speech: Speech is delayed and tangential.         Cognition and Memory: Memory is impaired. She exhibits impaired recent memory and impaired remote memory. BP (!) 101/50   Pulse 72   Temp 97.7 °F (36.5 °C) (Infrared)   Resp 16   Wt 143 lb 3.2 oz (65 kg)   SpO2 90%   BMI 24.58 kg/m²     Assessment/Plan      1.  Late onset Alzheimer's disease with behavioral disturbance ([facility] [address] -chronic and continue supportive care meds. Weight overall stable. Continue to monitor for falls. Will discontinue her Remeron. Continue all comfort medications. 2. Moderate episode of recurrent major depressive disorder (HCC) -chronic and continue Zoloft. Will discontinue mirtazapine. 3. Gastroesophageal reflux disease without esophagitis -chronic and continue Carafate and Pepcid. 4. Essential hypertension -controlled on no current medications. We will continue to monitor. 5. Chronic osteoarthritis -no signs and symptoms of pain. Continue Ultram, Tylenol, Biofreeze, positioning. No recent falls. 6. Other insomnia-chronic and continue trazodone. Will discontinue Remeron. 7. Bullous pemphigoid -no current medications. Skin remains intact. 8. Hospice care -continue supportive care. Resident has PD, AD, HTN, MDD and it is expected to last 15 or more months. These chronic conditions place resident at a significant risk of death, acute exacerbation, decline in function or functional decline. Her comprehensive plan was  monitored today. I spent 20 minutes reviewing plan. Patient seen and examined. History partially obtained by chart review and nursing notes. I have reviewed patient's past medical, surgical, social, and family history and have made updates where appropriate.   See facility EMR for updated medication list.       Electronically signed by Emory Beatty MD on 7/23/2021 at 2:52 PM

## 2021-08-20 NOTE — PROGRESS NOTES
Duke Cordero is a 80 y.o. female who presents today for her medical conditions/complaints as noted below. Chief Complaint   Patient presents with    Other     Monthly follow up for chronic medical conditions            HPI:     Matthieu Griggs was seen and evaluated today during nursing home rounds. She is seen today for her chronic medical conditions including f Alzheimer's disease, hypertension, bullous pemphigoid, depression, GERD, insomnia, osteoarthritis, depression, Parkinson's disease. She is currently hospice. She did recently slide out of her chair on 08/11/21, but did not suffer any injuries. No changes in behavior. Is requiring PRN ativan infrequently, for agitation. The patient's weight has been stable. She has no complaints at this time. Has not had any vomiting, chest pain, difficulty breathing. She is stooling well and urinating well. Last 3 BP readings were elevated, most recently 554 systolic, usually runs in the 730W systolic. Does have Hx of HTN, was on HTCZ in the past, but has not required since admission. Past Medical History:   Diagnosis Date    Allergic rhinitis     [facility]'s [facility] ([facility] [address]     Anxiety disorder     Bullous pemphigoid     Dysphagia     GERD (gastroesophageal reflux disease)     HLD (hyperlipidemia)     HTN (hypertension)     MDD (major depressive disorder)     Meniere's disease     Parkinson's disease ([address]     Polyosteoarthritis       No past surgical history on file.     Family History   Family history unknown: Yes       Social History     Tobacco Use    Smoking status: Never Smoker    Smokeless tobacco: Never Used   Substance Use Topics    Alcohol use: Never      Allergies   Allergen Reactions    Aricept [Donepezil Hcl]     Cardizem [Diltiazem Hcl]     Cardura [Doxazosin Mesylate]     Codeine     Doxycycline     Macrodantin [Nitrofurantoin Macrocrystal]     Sulfa Antibiotics        Health Maintenance   Topic Date Due    Potassium monitoring  Never done  Creatinine monitoring  Never done    DTaP/Tdap/Td vaccine (1 - Tdap) Never done    Shingles Vaccine (1 of 2) Never done    DEXA (modify frequency per FRAX score)  Never done    Flu vaccine (1) 09/01/2021    Pneumococcal 65+ years Vaccine  Completed    COVID-19 Vaccine  Completed    Hepatitis A vaccine  Aged Out    Hepatitis B vaccine  Aged Out    Hib vaccine  Aged Out    Meningococcal (ACWY) vaccine  Aged Out       Subjective:      Review of Systems   Unable to perform ROS: Dementia       Objective:     Physical Exam  Vitals and nursing note reviewed. Exam conducted with a chaperone present. Constitutional:       General: She is not in acute distress. Appearance: Normal appearance. She is not ill-appearing. HENT:      Head: Normocephalic and atraumatic. Right Ear: Hearing normal.      Left Ear: Hearing normal.      Nose: Nose normal.   Eyes:      General: Lids are normal.   Cardiovascular:      Rate and Rhythm: Normal rate and regular rhythm. Heart sounds: Normal heart sounds, S1 normal and S2 normal.   Pulmonary:      Effort: Pulmonary effort is normal. No tachypnea or bradypnea. Breath sounds: Normal breath sounds. Abdominal:      General: Abdomen is flat. Bowel sounds are normal.      Palpations: Abdomen is soft. Tenderness: There is no abdominal tenderness. Musculoskeletal:      Cervical back: Normal range of motion and neck supple. Right lower leg: No edema. Left lower leg: No edema. Skin:     General: Skin is warm and dry. Neurological:      Mental Status: She is alert. Mental status is at baseline. She is disoriented. Psychiatric:         Mood and Affect: Affect is labile. Speech: Speech is delayed and tangential.         Cognition and Memory: Memory is impaired. She exhibits impaired recent memory and impaired remote memory.        BP (!) 158/85   Pulse 72   Temp 97 °F (36.1 °C) (Infrared)   Resp 18   Wt 141 lb 9.6 oz (64.2 kg)   SpO2 94%   BMI 24.31 kg/m²     Assessment/Plan      1. Late onset Alzheimer's disease with behavioral disturbance ([facility] [address] -chronic and continue supportive care meds. Weight overall stable. Continue to monitor for falls. Will discontinue her Remeron. Continue all comfort medications. 2. Moderate episode of recurrent major depressive disorder (HCC) -chronic and continue Zoloft. Will discontinue mirtazapine. 3. Gastroesophageal reflux disease without esophagitis -chronic and continue Carafate and Pepcid. 4. Essential hypertension - Was controlled without meds. Has been on HCTZ in the past. /85 this visit. She usually runs in the 110s/60s. BP seems to be checked once a week. Will check BP 3x/week for the the next 3 weeks and re-evaluate need for BP meds. We will continue to monitor. 5. Chronic osteoarthritis -no signs and symptoms of pain. Continue Ultram, Tylenol, Biofreeze, positioning. Slid out of chair 08/11/21, but suffered no injuries. No need for imaging at this time. 6. Other insomnia-chronic and continue trazodone. Will discontinue Remeron. 7. Bullous pemphigoid -no current medications. Skin remains intact. 8. Hospice care -continue supportive care. Resident has PD, AD, HTN, MDD and it is expected to last 15 or more months. These chronic conditions place resident at a significant risk of death, acute exacerbation, decline in function or functional decline. Her comprehensive plan was  monitored today. I spent 20 minutes reviewing plan. Patient seen and examined. History partially obtained by chart review and nursing notes. I have reviewed patient's past medical, surgical, social, and family history and have made updates where appropriate.   See facility EMR for updated medication list.       Electronically signed by Tobi Mcfarlane MD on 8/20/2021 at 11:12 AM

## 2021-08-20 NOTE — PROGRESS NOTES
Attending Supervising Physicians Attestation Statement  I saw and evaluated the patient.   I discussed the findings and plans with resident physician and agree as documented in his note     Electronically signed by Abdoul Hernandez MD on 8/20/21 at 11:31 AM EDT

## 2021-09-17 NOTE — PROGRESS NOTES
Attending Supervising Physicians Attestation Statement  I saw and evaluated the patient.   I discussed the findings and plans with resident physician and agree as documented in his note     Electronically signed by Ashok Mccullough MD on 9/17/21 at 11:33 AM EDT

## 2021-09-17 NOTE — PROGRESS NOTES
Dm Meza is a 80 y.o. female who presents today for her medical conditions/complaints as noted below. Chief Complaint   Patient presents with    Other     Monthly follow up for chronic medical conditions           HPI:     Jayleen Collins was seen and evaluated today during nursing home rounds. She is seen today for her chronic medical conditions including f Alzheimer's disease, hypertension, bullous pemphigoid, depression, GERD, insomnia, osteoarthritis, depression, Parkinson's disease. She is currently hospice. No falls. Eating well. No change in bowel or bladder habits. Is requiring PRN ativan infrequently, for agitation. The patient's weight has been stable. She has no complaints at this time. Has not had any vomiting, chest pain, difficulty breathing. BP stable this visit. Does have Hx of HTN, was on HTCZ in the past, but has not required since admission. Past Medical History:   Diagnosis Date    Allergic rhinitis     [facility]'s disease ([facility] [address]     Anxiety disorder     Bullous pemphigoid     Dysphagia     GERD (gastroesophageal reflux disease)     HLD (hyperlipidemia)     HTN (hypertension)     MDD (major depressive disorder)     Meniere's disease     Parkinson's disease ([facility] [address]     Polyosteoarthritis       No past surgical history on file.     Family History   Family history unknown: Yes       Social History     Tobacco Use    Smoking status: Never Smoker    Smokeless tobacco: Never Used   Substance Use Topics    Alcohol use: Never      Allergies   Allergen Reactions    Aricept [Donepezil Hcl]     Cardizem [Diltiazem Hcl]     Cardura [Doxazosin Mesylate]     Codeine     Doxycycline     Macrodantin [Nitrofurantoin Macrocrystal]     Sulfa Antibiotics        Health Maintenance   Topic Date Due    Potassium monitoring  Never done    Creatinine monitoring  Never done    DTaP/Tdap/Td vaccine (1 - Tdap) Never done    Shingles Vaccine (1 of 2) Never done    DEXA (modify frequency per [facility] score)  Never done    Flu vaccine (1) Never done    Pneumococcal 65+ years Vaccine  Completed    COVID-19 Vaccine  Completed    Hepatitis A vaccine  Aged Out    Hepatitis B vaccine  Aged Out    Hib vaccine  Aged Out    Meningococcal (ACWY) vaccine  Aged Out       Subjective:      Review of Systems   Unable to perform ROS: Dementia       Objective:     Physical Exam  Vitals and nursing note reviewed. Exam conducted with a chaperone present. Constitutional:       General: She is not in acute distress. Appearance: Normal appearance. She is not ill-appearing. HENT:      Head: Normocephalic and atraumatic. Right Ear: Hearing normal.      Left Ear: Hearing normal.      Nose: Nose normal.   Eyes:      General: Lids are normal.   Cardiovascular:      Rate and Rhythm: Normal rate and regular rhythm. Heart sounds: Normal heart sounds, S1 normal and S2 normal.   Pulmonary:      Effort: Pulmonary effort is normal. No tachypnea or bradypnea. Breath sounds: Normal breath sounds. Abdominal:      General: Abdomen is flat. Bowel sounds are normal.      Palpations: Abdomen is soft. Tenderness: There is no abdominal tenderness. Musculoskeletal:      Cervical back: Normal range of motion and neck supple. Right lower leg: No edema. Left lower leg: No edema. Skin:     General: Skin is warm and dry. Neurological:      Mental Status: She is alert. Mental status is at baseline. She is disoriented. Psychiatric:         Mood and Affect: Affect is labile. Speech: Speech is delayed and tangential.         Cognition and Memory: Memory is impaired. She exhibits impaired recent memory and impaired remote memory. /80   Pulse 84   Temp 97.7 °F (36.5 °C) (Infrared)   Resp 16   Wt 142 lb 3.2 oz (64.5 kg)   SpO2 92%   BMI 24.41 kg/m²     Assessment/Plan      1. Late onset [facility] with [facility] -chronic and continue supportive care meds. Weight overall stable. Continue to monitor for falls. Will discontinue her Remeron. Continue all comfort medications. 2. Moderate episode of recurrent major depressive disorder (HCC) -chronic and continue Zoloft. 3. Gastroesophageal reflux disease without esophagitis -chronic and continue Carafate and Pepcid. 4. Essential hypertension - Was controlled without meds. Has been on HCTZ in the past. BP stable today. Did have some elevated readings over the past couple days. No urgency or emergency. No new symptoms. We will continue to monitor. 5. Chronic osteoarthritis -no signs and symptoms of pain. Continue Ultram, Tylenol, Biofreeze, positioning. No new falls    6. Other insomnia-chronic and continue trazodone. 7. Bullous pemphigoid -no current medications. Skin remains intact. 8. Hospice care -continue supportive care. Resident has PD, AD, HTN, MDD and it is expected to last 15 or more months. These chronic conditions place resident at a significant risk of death, acute exacerbation, decline in function or functional decline. Her comprehensive plan was  monitored today. I spent 20 minutes reviewing plan. Patient seen and examined. History partially obtained by chart review and nursing notes. I have reviewed patient's past medical, surgical, social, and family history and have made updates where appropriate.   See facility EMR for updated medication list.       Electronically signed by Pili Salas MD on 9/17/2021 at 10:05 AM

## 2021-10-29 NOTE — PROGRESS NOTES
Karly Kinney is a 80 y.o. female who presents today for her medical conditions/complaints as noted below. Chief Complaint   Patient presents with    Other     Monthly follow up for chronic medical conditions            HPI:     Lisa Nicole was seen and evaluated today during nursing home rounds. She is seen today for her chronic medical conditions including f Alzheimer's disease, hypertension, bullous pemphigoid, depression, GERD, insomnia, osteoarthritis, depression, Parkinson's disease. She is currently hospice. No falls. Eating well. No change in bowel or bladder habits. Is requiring PRN ativan infrequently, for agitation. The patient's weight has been stable. She has no complaints at this time. Has not had any vomiting, chest pain, difficulty breathing. BP stable this visit. Does have Hx of HTN, was on HTCZ in the past, but has not required since admission. Past Medical History:   Diagnosis Date    Allergic rhinitis     Alzheimer's disease ([facility] [address]     Anxiety disorder     Bullous pemphigoid     Dysphagia     GERD (gastroesophageal reflux disease)     HLD (hyperlipidemia)     HTN (hypertension)     MDD (major depressive disorder)     Meniere's disease     Parkinson's disease ([facility] [address]     Polyosteoarthritis       No past surgical history on file.     Family History   Family history unknown: Yes       Social History     Tobacco Use    Smoking status: Never Smoker    Smokeless tobacco: Never Used   Substance Use Topics    Alcohol use: Never      Allergies   Allergen Reactions    Aricept [Donepezil Hcl]     Cardizem [Diltiazem Hcl]     Cardura [Doxazosin Mesylate]     Codeine     Doxycycline     Macrodantin [Nitrofurantoin Macrocrystal]     Sulfa Antibiotics        Health Maintenance   Topic Date Due    Potassium monitoring  Never done    Creatinine monitoring  Never done    DTaP/Tdap/Td vaccine (1 - Tdap) Never done    Shingles Vaccine (1 of 2) Never done    DEXA (modify frequency per [facility] score)  Never done    Flu vaccine (1) Never done    Pneumococcal 65+ years Vaccine  Completed    COVID-19 Vaccine  Completed    Hepatitis A vaccine  Aged Out    Hepatitis B vaccine  Aged Out    Hib vaccine  Aged Out    Meningococcal (ACWY) vaccine  Aged Out       Subjective:      Review of Systems   Unable to perform ROS: Dementia       Objective:     Physical Exam  Vitals and nursing note reviewed. Exam conducted with a chaperone present. Constitutional:       General: She is not in acute distress. Appearance: Normal appearance. She is not ill-appearing. HENT:      Head: Normocephalic and atraumatic. Right Ear: Hearing normal.      Left Ear: Hearing normal.      Nose: Nose normal.   Eyes:      General: Lids are normal.   Cardiovascular:      Rate and Rhythm: Normal rate and regular rhythm. Heart sounds: Normal heart sounds, S1 normal and S2 normal.   Pulmonary:      Effort: Pulmonary effort is normal. No tachypnea or bradypnea. Breath sounds: Normal breath sounds. Abdominal:      General: Abdomen is flat. Bowel sounds are normal.      Palpations: Abdomen is soft. Tenderness: There is no abdominal tenderness. Musculoskeletal:         General: Deformity (contractures of hips, knees, neck, ankles) present. Cervical back: Normal range of motion and neck supple. Right lower leg: No edema. Left lower leg: No edema. Skin:     General: Skin is warm and dry. Neurological:      Mental Status: She is alert. Mental status is at baseline. She is disoriented. Psychiatric:         Mood and Affect: Affect is labile. Speech: Speech is delayed and tangential.         Cognition and Memory: Memory is impaired. She exhibits impaired recent memory and impaired remote memory. /63   Pulse 85   Temp 97.3 °F (36.3 °C) (Infrared)   Resp 18   Wt 140 lb 9.6 oz (63.8 kg)   SpO2 96%   BMI 24.13 kg/m²     Assessment/Plan      1.  Late onset Alzheimer's disease with behavioral disturbance ([facility] [address] -chronic and continue supportive care meds. Weight overall stable. Continue to monitor for falls. Will discontinue her Remeron. Continue all comfort medications. 2. Moderate episode of recurrent major depressive disorder (HCC) -chronic and continue Zoloft. 3. Gastroesophageal reflux disease without esophagitis -chronic and continue Carafate and Pepcid. 4. Essential hypertension - Controlled without meds. Has been on HCTZ in the past. BP stable today. No new symptoms. We will continue to monitor. 5. Chronic osteoarthritis -no signs and symptoms of pain. Contractures worsening. Continue Ultram, Tylenol, Biofreeze, positioning. No new falls    6. Other insomnia-chronic and continue trazodone. 7. Bullous pemphigoid -no current medications. Skin remains intact. 8. Hospice care -continue supportive care. Resident has PD, AD, HTN, MDD and it is expected to last 15 or more months. These chronic conditions place resident at a significant risk of death, acute exacerbation, decline in function or functional decline. Her comprehensive plan was  monitored today. I spent 20 minutes reviewing plan. Patient seen and examined. History partially obtained by chart review and nursing notes. I have reviewed patient's past medical, surgical, social, and family history and have made updates where appropriate.   See facility EMR for updated medication list.       Electronically signed by Jeff Polk MD on 11/5/2021 at 10:44 AM

## 2021-11-19 NOTE — PROGRESS NOTES
Karly Kinney is a 80 y.o. female who presents today for her medical conditions/complaints as noted below. Chief Complaint   Patient presents with    Other     Monthly follow up for chronic medical conditions            HPI:     Lisa Nicole was seen and evaluated today during nursing home rounds. She is seen today for her chronic medical conditions including f Alzheimer's disease, hypertension, bullous pemphigoid, depression, GERD, insomnia, osteoarthritis, depression, Parkinson's disease. She is currently hospice. No falls. Eating well. No change in bowel or bladder habits. Is requiring PRN ativan infrequently, for agitation. The patient's weight has been stable. She has no complaints at this time. Has not had any vomiting, chest pain, difficulty breathing. BP stable this visit. Past Medical History:   Diagnosis Date    Allergic rhinitis     Alzheimer's disease ([facility] [address]     Anxiety disorder     Bullous pemphigoid     Dysphagia     GERD (gastroesophageal reflux disease)     HLD (hyperlipidemia)     HTN (hypertension)     MDD (major depressive disorder)     Meniere's disease     Parkinson's disease ([facility] [address]     Polyosteoarthritis       No past surgical history on file.     Family History   Family history unknown: Yes       Social History     Tobacco Use    Smoking status: Never Smoker    Smokeless tobacco: Never Used   Substance Use Topics    Alcohol use: Never      Allergies   Allergen Reactions    Aricept [Donepezil Hcl]     Cardizem [Diltiazem Hcl]     Cardura [Doxazosin Mesylate]     Codeine     Doxycycline     Macrodantin [Nitrofurantoin Macrocrystal]     Sulfa Antibiotics        Health Maintenance   Topic Date Due    Potassium monitoring  Never done    Creatinine monitoring  Never done    DTaP/Tdap/Td vaccine (1 - Tdap) Never done    Shingles Vaccine (1 of 2) Never done    DEXA (modify frequency per FRAX score)  Never done    Flu vaccine (1) Never done    Pneumococcal 65+ years Vaccine Completed    COVID-19 Vaccine  Completed    Hepatitis A vaccine  Aged Out    Hepatitis B vaccine  Aged Out    Hib vaccine  Aged Out    Meningococcal (ACWY) vaccine  Aged Out       Subjective:      Review of Systems   Unable to perform ROS: Dementia       Objective:     Physical Exam  Vitals and nursing note reviewed. Exam conducted with a chaperone present. Constitutional:       General: She is not in acute distress. Appearance: Normal appearance. She is not ill-appearing. HENT:      Head: Normocephalic and atraumatic. Right Ear: Hearing normal.      Left Ear: Hearing normal.      Nose: Nose normal.   Eyes:      General: Lids are normal.   Cardiovascular:      Rate and Rhythm: Normal rate and regular rhythm. Heart sounds: Normal heart sounds, S1 normal and S2 normal.   Pulmonary:      Effort: Pulmonary effort is normal. No tachypnea or bradypnea. Breath sounds: Normal breath sounds. Abdominal:      General: Abdomen is flat. Bowel sounds are normal.      Palpations: Abdomen is soft. Tenderness: There is no abdominal tenderness. Musculoskeletal:         General: Deformity (contractures of hips, knees, neck, ankles) present. Cervical back: Normal range of motion and neck supple. Right lower leg: No edema. Left lower leg: No edema. Skin:     General: Skin is warm and dry. Neurological:      Mental Status: She is alert. Mental status is at baseline. She is disoriented. Psychiatric:         Mood and Affect: Affect is labile. Speech: Speech is delayed and tangential.         Cognition and Memory: Memory is impaired. She exhibits impaired recent memory and impaired remote memory. /78   Pulse 88   Temp 97.1 °F (36.2 °C) (Infrared)   Resp 17   Ht 5' 4\" (1.626 m)   Wt 141 lb 6.4 oz (64.1 kg)   SpO2 91%   BMI 24.27 kg/m²     Assessment/Plan      1.  Late onset Alzheimer's disease with behavioral disturbance ([facility] [address] -chronic and continue supportive care meds. Weight overall stable. Continue to monitor for falls. Will discontinue her Remeron. Continue all comfort medications. 2. Moderate episode of recurrent major depressive disorder (HCC) -chronic and continue Zoloft. 3. Gastroesophageal reflux disease without esophagitis -chronic and continue Carafate and Pepcid. 4. Essential hypertension - Controlled without meds. Has been on HCTZ in the past. BP stable today. No new symptoms. We will continue to monitor. 5. Chronic osteoarthritis -no signs and symptoms of pain. Contractures worsening. Continue Ultram, Tylenol, Biofreeze, positioning. No new falls    6. Other insomnia-chronic and continue trazodone. 7. Bullous pemphigoid -no current medications. Skin remains intact. 8. Hospice care -continue supportive care. Resident has PD, AD, HTN, MDD and it is expected to last 15 or more months. These chronic conditions place resident at a significant risk of death, acute exacerbation, decline in function or functional decline. Her comprehensive plan was  monitored today. I spent 20 minutes reviewing plan. Patient seen and examined. History partially obtained by chart review and nursing notes. I have reviewed patient's past medical, surgical, social, and family history and have made updates where appropriate.   See facility EMR for updated medication list.       Electronically signed by Rell Carrasquillo MD on 11/19/2021 at 2:52 PM

## 2021-12-16 NOTE — PROGRESS NOTES
Carla Jacob is a 80 y.o. female who presents today for her medical conditions/complaints as noted below. Chief Complaint   Patient presents with    Other     Monthly follow up for chronic medical conditions            HPI:     Diego Hua was seen and evaluated today during nursing home rounds. She is seen today for her chronic medical conditions including Alzheimer's disease, hypertension, bullous pemphigoid, depression, GERD, insomnia, osteoarthritis, depression, Parkinson's disease. She is currently hospice. No falls. Eating well. No change in bowel or bladder habits. Is requiring PRN ativan infrequently, for agitation. The patient's weight has been stable. She has no complaints at this time. Has not had any vomiting, chest pain, difficulty breathing. BP stable this visit. Past Medical History:   Diagnosis Date    Allergic rhinitis     Alzheimer's disease ([facility] [address]     Anxiety disorder     Bullous pemphigoid     Dysphagia     GERD (gastroesophageal reflux disease)     HLD (hyperlipidemia)     HTN (hypertension)     MDD (major depressive disorder)     Meniere's disease     Parkinson's disease ([facility] [address]     Polyosteoarthritis       No past surgical history on file.     Family History   Family history unknown: Yes       Social History     Tobacco Use    Smoking status: Never Smoker    Smokeless tobacco: Never Used   Substance Use Topics    Alcohol use: Never      Allergies   Allergen Reactions    Aricept [Donepezil Hcl]     Cardizem [Diltiazem Hcl]     Cardura [Doxazosin Mesylate]     Codeine     Doxycycline     Macrodantin [Nitrofurantoin Macrocrystal]     Sulfa Antibiotics        Health Maintenance   Topic Date Due    Potassium monitoring  Never done    Creatinine monitoring  Never done    DTaP/Tdap/Td vaccine (1 - Tdap) Never done    Shingles Vaccine (1 of 2) Never done    DEXA (modify frequency per FRAX score)  Never done    Flu vaccine (1) Never done    Pneumococcal 65+ years Vaccine Completed    COVID-19 Vaccine  Completed    Hepatitis A vaccine  Aged Out    Hepatitis B vaccine  Aged Out    Hib vaccine  Aged Out    Meningococcal (ACWY) vaccine  Aged Out       Subjective:      Review of Systems   Unable to perform ROS: Dementia       Objective:     Physical Exam  Vitals and nursing note reviewed. Exam conducted with a chaperone present. Constitutional:       General: She is not in acute distress. Appearance: Normal appearance. She is not ill-appearing. HENT:      Head: Normocephalic and atraumatic. Right Ear: Hearing normal.      Left Ear: Hearing normal.      Nose: Nose normal.   Eyes:      General: Lids are normal.   Cardiovascular:      Rate and Rhythm: Normal rate and regular rhythm. Heart sounds: Normal heart sounds, S1 normal and S2 normal.   Pulmonary:      Effort: Pulmonary effort is normal. No tachypnea or bradypnea. Breath sounds: Normal breath sounds. Abdominal:      General: Abdomen is flat. Bowel sounds are normal.      Palpations: Abdomen is soft. Tenderness: There is no abdominal tenderness. Musculoskeletal:         General: Deformity (contractures of hips, knees, neck, ankles) present. Cervical back: Normal range of motion and neck supple. Right lower leg: No edema. Left lower leg: No edema. Skin:     General: Skin is warm and dry. Neurological:      Mental Status: She is alert. Mental status is at baseline. She is disoriented. Psychiatric:         Mood and Affect: Affect is labile. Speech: Speech is delayed and tangential.         Cognition and Memory: Memory is impaired. She exhibits impaired recent memory and impaired remote memory. /72   Pulse 76   Temp 97.7 °F (36.5 °C) (Infrared)   Resp 14   Ht 5' 4\" (1.626 m)   Wt 143 lb 9.6 oz (65.1 kg)   SpO2 94%   BMI 24.65 kg/m²     Assessment/Plan      1.  Late onset Alzheimer's disease with behavioral disturbance ([facility] [address] -chronic and continue supportive care meds. Weight overall stable. Continue to monitor for falls. Will discontinue her Remeron. Continue all comfort medications. 2. Moderate episode of recurrent major depressive disorder (HCC) -chronic and continue Zoloft. 3. Gastroesophageal reflux disease without esophagitis -chronic and continue Carafate and Pepcid. 4. Essential hypertension - Controlled without meds. Has been on HCTZ in the past. BP stable today. No new symptoms. We will continue to monitor. 5. Chronic osteoarthritis -no signs and symptoms of pain. Contractures worsening. Continue Ultram, Tylenol, Biofreeze, positioning. No new falls    6. Other insomnia-chronic and continue trazodone. 7. Bullous pemphigoid -no current medications. Skin remains intact. 8. Parkinson's disease- Continue supportive care. Patient is not mobile. Low risk for falls   9. Anxiety/ Agitation - Secondary to #1. Continue antivan 0.5mg as needed. 10. Hospice care -continue supportive care. Resident has PD, AD, HTN, MDD and it is expected to last 15 or more months. These chronic conditions place resident at a significant risk of death, acute exacerbation, decline in function or functional decline. Her comprehensive plan was  monitored today. I spent 20 minutes reviewing plan. Patient seen and examined. History partially obtained by chart review and nursing notes. I have reviewed patient's past medical, surgical, social, and family history and have made updates where appropriate.   See facility EMR for updated medication list.       Electronically signed by Carolann Duarte MD on 12/16/2021 at 4:14 PM